# Patient Record
Sex: MALE | Race: WHITE | Employment: OTHER | URBAN - METROPOLITAN AREA
[De-identification: names, ages, dates, MRNs, and addresses within clinical notes are randomized per-mention and may not be internally consistent; named-entity substitution may affect disease eponyms.]

---

## 2017-02-21 DIAGNOSIS — H40.1131 PRIMARY OPEN ANGLE GLAUCOMA OF BOTH EYES, MILD STAGE: ICD-10-CM

## 2017-02-21 RX ORDER — LATANOPROST 50 UG/ML
1 SOLUTION/ DROPS OPHTHALMIC AT BEDTIME
Qty: 10 ML | Refills: 4 | Status: SHIPPED | OUTPATIENT
Start: 2017-02-21 | End: 2018-03-27

## 2017-03-07 ENCOUNTER — OFFICE VISIT (OUTPATIENT)
Dept: OPHTHALMOLOGY | Facility: CLINIC | Age: 72
End: 2017-03-07

## 2017-03-07 DIAGNOSIS — Z96.1 PSEUDOPHAKIA, BOTH EYES: ICD-10-CM

## 2017-03-07 DIAGNOSIS — H40.1131 PRIMARY OPEN ANGLE GLAUCOMA OF BOTH EYES, MILD STAGE: Primary | ICD-10-CM

## 2017-03-07 RX ORDER — PRAVASTATIN SODIUM 20 MG
20 TABLET ORAL AT BEDTIME
COMMUNITY
Start: 2017-02-14

## 2017-03-07 RX ORDER — PRAMIPEXOLE DIHYDROCHLORIDE 0.25 MG/1
TABLET ORAL
COMMUNITY
End: 2017-03-07

## 2017-03-07 RX ORDER — LEVOTHYROXINE SODIUM 50 UG/1
50 TABLET ORAL DAILY
COMMUNITY
Start: 2017-02-14

## 2017-03-07 RX ORDER — TRIAMTERENE AND HYDROCHLOROTHIAZIDE 37.5; 25 MG/1; MG/1
1 CAPSULE ORAL DAILY
COMMUNITY
Start: 2017-02-14 | End: 2019-02-26

## 2017-03-07 RX ORDER — ZOLPIDEM TARTRATE 10 MG/1
TABLET ORAL
COMMUNITY
Start: 2016-02-13 | End: 2017-09-12

## 2017-03-07 RX ORDER — ACETAMINOPHEN 500 MG
1000 TABLET ORAL
COMMUNITY
Start: 2017-03-02

## 2017-03-07 RX ORDER — LISINOPRIL 40 MG/1
40 TABLET ORAL
COMMUNITY
Start: 2017-03-03 | End: 2017-03-07

## 2017-03-07 RX ORDER — METOPROLOL SUCCINATE 25 MG/1
25 TABLET, EXTENDED RELEASE ORAL DAILY
COMMUNITY
Start: 2017-02-14

## 2017-03-07 RX ORDER — TRAMADOL HYDROCHLORIDE 50 MG/1
TABLET ORAL
COMMUNITY
Start: 2016-11-22 | End: 2019-02-26

## 2017-03-07 ASSESSMENT — REFRACTION_WEARINGRX
OS_AXIS: 180
OS_CYLINDER: +3.75
OS_SPHERE: -0.75
OD_SPHERE: -3.00
OD_AXIS: 165
OD_ADD: +2.75
OD_CYLINDER: +1.25
OS_ADD: +2.75

## 2017-03-07 ASSESSMENT — EXTERNAL EXAM - LEFT EYE: OS_EXAM: BROW PTOSIS

## 2017-03-07 ASSESSMENT — TONOMETRY
OD_IOP_MMHG: 17
OS_IOP_MMHG: 16
IOP_METHOD: APPLANATION

## 2017-03-07 ASSESSMENT — VISUAL ACUITY
METHOD: SNELLEN - LINEAR
OD_CC: J1+
OD_CC: 20/20
OS_CC: 20/20
OS_CC: J1+
CORRECTION_TYPE: GLASSES

## 2017-03-07 ASSESSMENT — EXTERNAL EXAM - RIGHT EYE: OD_EXAM: BROW PTOSIS

## 2017-03-07 ASSESSMENT — CUP TO DISC RATIO
OD_RATIO: 0.55
OS_RATIO: 0.45

## 2017-03-07 NOTE — MR AVS SNAPSHOT
After Visit Summary   3/7/2017    Yordan Nolan    MRN: [id number]           Patient Information     Date Of Birth          1945        Visit Information        Provider Department      3/7/2017 1:30 PM Milady Colon MD [facility]        Today's Diagnoses     Primary open angle glaucoma of both eyes, mild stage    -  1    Pseudophakia, both eyes           Follow-ups after your visit        Follow-up notes from your care team     Return in about 6 months (around 9/7/2017) for IOP check with applanation (no dilation).      Your next 10 appointments already scheduled     Sep 12, 2017  1:00 PM CDT   Complete Exam with Milady Colon MD   [facility] ([facility])    [facility]  [address] [address] [phone]              Who to contact     Please call your clinic at [phone] to:    Ask questions about your health    Make or cancel appointments    Discuss your medicines    Learn about your test results    Speak to your doctor   If you have compliments or concerns about an experience at your clinic, or if you wish to file a complaint, please contact [facility] Patient Relations at [phone] or email us at [email or web address]         Additional Information About Your Visit        MyChart Information     [employer] is an electronic gateway that provides easy, online access to your medical records. With [employer], you can request a clinic appointment, read your test results, renew a prescription or communicate with your care team.     To sign up for [employer] visit the website at [email or web address]   You will be asked to enter the access code listed below, as well as some personal information. Please follow the directions to create your username and password.     Your access code is: [email or web address]  Expires: 6/5/2017  2:35 PM      Your access code will  in 90 days. If you need help or a new code, please contact your [facility] or call [phone] for assistance.        Care EveryWhere ID     This is your Care EveryWhere ID. This could be used by other organizations to access your [place] medical records  [id number]         Blood Pressure from Last 3 Encounters:   No data found for BP    Weight from Last 3 Encounters:   No data found for Wt              We Performed the Following     OCT Optic Nerve RNFL Spectralis OU (both eyes)     OVF 24-2 Dynamic OU          Today's Medication Changes          These changes are accurate as of: 3/7/17  2:54 PM.  If you have any questions, ask your nurse or doctor.               These medicines have changed or have updated prescriptions.        Dose/Directions    levothyroxine 50 MCG tablet   Commonly known as:  SYNTHROID/LEVOTHROID   This may have changed:  Another medication with the same name was removed. Continue taking this medication, and follow the directions you see here.        Dose:  50 mcg   Take 50 mcg by mouth   Refills:  0       lisinopril 40 MG tablet   Commonly known as:  PRINIVIL/ZESTRIL   This may have changed:  Another medication with the same name was removed. Continue taking this medication, and follow the directions you see here.        Dose:  40 mg   Take 40 mg by mouth   Refills:  0       MIRAPEX 0.25 MG tablet   This may have changed:  Another medication with the same name was removed. Continue taking this medication, and follow the directions you see here.   Generic drug:  pramipexole        Refills:  0       triamterene-hydrochlorothiazide 37.5-25 MG per capsule   Commonly known as:  DYAZIDE   This may have changed:  Another medication with the same name was removed. Continue taking this medication, and follow the directions you see here.        Dose:  1 capsule   Take 1 capsule by mouth   Refills:  0         Stop taking these medicines if  you haven't already. Please contact your care team if you have questions.     amLODIPine 2.5 MG tablet   Commonly known as:  NORVASC                    Primary Care Provider Office Phone # Fax #    Jeo Johnson [phone]       [facility] [address] [facility] [address]        Thank you!     Thank you for choosing [facility]  for your care. Our goal is always to provide you with excellent care. Hearing back from our patients is one way we can continue to improve our services. Please take a few minutes to complete the written survey that you may receive in the mail after your visit with us. Thank you!             Your Updated Medication List - Protect others around you: Learn how to safely use, store and throw away your medicines at www.disposemymeds.org.          This list is accurate as of: 3/7/17  2:54 PM.  Always use your most recent med list.                   Brand Name Dispense Instructions for use    acetaminophen 500 MG tablet    TYLENOL     Take 1,000 mg by mouth       apixaban ANTICOAGULANT 5 MG tablet    ELIQUIS     Take 5 mg by mouth       aspirin EC 81 MG EC tablet      Take 81 mg by mouth       latanoprost 0.005 % ophthalmic solution    XALATAN    10 mL    Place 1 drop into both eyes At Bedtime       levothyroxine 50 MCG tablet    SYNTHROID/LEVOTHROID     Take 50 mcg by mouth       lisinopril 40 MG tablet    PRINIVIL/ZESTRIL     Take 40 mg by mouth       metoprolol 25 MG 24 hr tablet    TOPROL-XL     Take 25 mg by mouth       MIRAPEX 0.25 MG tablet   Generic drug:  pramipexole          pravastatin 20 MG tablet    PRAVACHOL     Take 20 mg by mouth       * traMADol 50 MG tablet    ULTRAM     TAKE 1 TABLET BY MOUTH EVERY 6 HOURS AS NEEDED FOR PAIN (HEADACHE)       * traMADol 50 MG tablet    ULTRAM     TAKE 1 TABLET BY MOUTH EVERY 6 HOURS AS NEEDED FOR PAIN (HEADACHE)       triamterene-hydrochlorothiazide 37.5-25 MG per capsule    DYAZIDE      Take 1 capsule by mouth       * zolpidem 10 MG tablet    AMBIEN         * zolpidem 10 MG tablet    AMBIEN         * Notice:  This list has 4 medication(s) that are the same as other medications prescribed for you. Read the directions carefully, and ask your doctor or other care provider to review them with you.

## 2017-03-07 NOTE — NURSING NOTE
Chief Complaints and History of Present Illnesses   Patient presents with     Glaucoma Follow Up     OVF/OCT/Ap/DIL     HPI    Symptoms:     No floaters   No flashes   Dryness (Comment: refresh Optive PRN)   No itching   No burning            Comments:  Latanoprost BE at night(10:00p.m.)  Trouble refocusing after reading with  Slab off, but it has helped a lot.  Roseanne Sneed COT 1:57 PM March 7, 2017

## 2017-03-07 NOTE — PROGRESS NOTES
HPI  Yordan Nolan is a 72 year old male here for IOP check. He feels his vision is good and stable in both eyes with current glasses. He is doing well using latanoprost in both eyes at night.    Assessment & Plan      (H40.11X1) Primary open angle glaucoma of both eyes, mild stage  (primary encounter diagnosis)  Comment: Previously followed by Dr. Douglas. S/p SLT OU 11/2015.    FH: Negative  Pachymetry:  Gonioscopy:  Tmax:   Today's IOP: 17/16  Target IOP: 19 or less (per patient)  Current medications: Latanoprost OU QHS  Meds to avoid:  Visual field:OVF 24-2 3/7/16  OD: Few scattered depressed points, MD -2.5, PSD 4.3   OS: Superotemporal defect (lid defect vs superior arcuate vs secondary to CVA), MD -3.3, PSD 5.5 (stable)  Nerve OCT: Spectralis optic nerve OCT (3/7/17)  OD: Avg RNFL thickness 79 (last 70), red superior, inferior yellow  OS: Poor quality scan, Avg RNFL thickness 62 (last 78), red inferior and superior    IOP good today with stable ONH appearance - tilted with history of high myopia    Plan: Continue current regimen. Return to clinic in 6 months for applanation (no dilation)    (H02.831,  H02.834) Dermatochalasis of both upper eyelids  Comment: S/p bilateral upper lid bleph 15-20 years ago by Dr. Douglas. He is becoming more bothered by recurrent dermatochalasis, and I think the has brow ptosis contributing.   Plan: Given brow involvement and that this would be re-op, have discussed referral to oculoplastics in the past. He would like to call to schedule this himself.     (I63.9) Cerebrovascular accident (CVA), unspecified (HCC)  Comment: Found to have had a small stroke in 2016 which the neurologist said was in an area that processes vision. Stable VF testing today.  Plan: He is now following with neurology and cardiology.    (Z96.1) Pseudophakia, both eyes  Comment: Good acuity  Plan: Observe      -----------------------------------------------------------------------------------    Patient disposition:   Return in about 6 months (around 9/7/2017) for IOP check with applanation (no dilation). or sooner as needed.    Teaching statement:  I have confirmed the content of the chief complaint, history of present illness, review of systems, and past medical/surgical history sections and edited the information as needed.    I have interviewed and examined the patient and confirm the pertinent findings.  I have discussed the case with the resident/fellow and agree with the findings and plan as documented.    Milady Colon MD  Comprehensive Ophthalmology & Ocular Pathology  Department of Ophthalmology and Visual Neurosciences  [email or web address]  Pager [phone]

## 2017-09-12 ENCOUNTER — OFFICE VISIT (OUTPATIENT)
Dept: OPHTHALMOLOGY | Facility: CLINIC | Age: 72
End: 2017-09-12

## 2017-09-12 DIAGNOSIS — H02.833 DERMATOCHALASIS OF EYELIDS OF BOTH EYES: ICD-10-CM

## 2017-09-12 DIAGNOSIS — H57.819 BROW PTOSIS: ICD-10-CM

## 2017-09-12 DIAGNOSIS — H02.836 DERMATOCHALASIS OF EYELIDS OF BOTH EYES: ICD-10-CM

## 2017-09-12 DIAGNOSIS — H40.1131 PRIMARY OPEN ANGLE GLAUCOMA OF BOTH EYES, MILD STAGE: Primary | ICD-10-CM

## 2017-09-12 RX ORDER — GABAPENTIN 300 MG/1
300 CAPSULE ORAL 3 TIMES DAILY
COMMUNITY
Start: 2017-07-25

## 2017-09-12 ASSESSMENT — TONOMETRY
OS_IOP_MMHG: 18
IOP_METHOD: APPLANATION
OD_IOP_MMHG: 19

## 2017-09-12 ASSESSMENT — REFRACTION_WEARINGRX
OD_AXIS: 165
OD_CYLINDER: +1.25
OS_SPHERE: -0.75
OS_ADD: +2.75
OD_SPHERE: -3.00
OD_ADD: +2.75
OS_AXIS: 180
OS_CYLINDER: +3.75

## 2017-09-12 ASSESSMENT — CUP TO DISC RATIO
OS_RATIO: 0.45
OD_RATIO: 0.55

## 2017-09-12 ASSESSMENT — VISUAL ACUITY
OS_CC: 20/15
METHOD: SNELLEN - LINEAR
CORRECTION_TYPE: GLASSES
OD_CC: 20/25-2/+2

## 2017-09-12 ASSESSMENT — CONF VISUAL FIELD
METHOD: COUNTING FINGERS
OD_NORMAL: 1
OS_NORMAL: 1

## 2017-09-12 NOTE — NURSING NOTE
Chief Complaints and History of Present Illnesses   Patient presents with     Glaucoma Follow Up     HPI    Last Eye Exam:  3/7/17   Affected eye(s):  Both   Symptoms:     No decreased vision   Floaters (Comment: not new)   No flashes      Duration:  6 months   Frequency:  Constant       Do you have eye pain now?:  No      Comments:  Pt here for glaucoma follow up and pressure check of both eyes  Pt hasn't noticed any changes to his eyes or vision since his last visit here    Loly Hester, EPHRAIM 12:58 PM 09/12/2017

## 2017-09-12 NOTE — PROGRESS NOTES
HPI  Yordan Nolan is a 72 year old male here for IOP check. He feels his vision is good and stable in both eyes with current glasses. He is doing well using latanoprost in both eyes at night. He notes more drooping of his upper eyelid skin. It is pushing his eyelashes down, and his eyelashes are in his vision which bothers him when he drives.     Assessment & Plan      (H40.11X1) Primary open angle glaucoma of both eyes, mild stage  (primary encounter diagnosis)  Comment: Previously followed by Dr. Douglas. S/p SLT OU 11/2015.    FH: Negative  Pachymetry:  Gonioscopy:  Tmax:   Today's IOP: 18/19  Target IOP: 19 or less (per patient)  Current medications: Latanoprost OU QHS  Meds to avoid:  Visual field:OVF 24-2 3/7/16  OD: Few scattered depressed points, MD -2.5, PSD 4.3   OS: Superotemporal defect (lid defect vs superior arcuate vs secondary to CVA), MD -3.3, PSD 5.5 (stable)  Nerve OCT: Spectralis optic nerve OCT (3/7/17)  OD: Avg RNFL thickness 79 (last 70), red superior, inferior yellow  OS: Poor quality scan, Avg RNFL thickness 62 (last 78), red inferior and superior    IOP good today with stable ONH appearance - tilted with history of high myopia    Plan: Continue current regimen. Return to clinic in 6 months for applanation, OVF 24-2, dilation, nerve OCT OU    (H02.831,  H02.834) Dermatochalasis of both upper eyelids  Comment: S/p bilateral upper lid bleph 15-20 years ago by Dr. Douglas. He is becoming more bothered by recurrent dermatochalasis and brow ptosis contributing.   Plan: Refer to oculoplastics    (I63.9) Cerebrovascular accident (CVA), unspecified (HCC)  Comment: Found to have had a small stroke in 2016 which the neurologist said was in an area that processes vision. Stable VF testing last visit.  Plan: He is now following with neurology and cardiology.    (Z96.1) Pseudophakia, both eyes  Comment: Good acuity  Plan: Observe      -----------------------------------------------------------------------------------    Patient disposition:   Return in about 6 months (around 3/12/2018) for IOP with applanation, OVF 24-2, dilation, nerve OCT OU. or sooner as needed.    Teaching statement:  Complete documentation of historical and exam elements from today's encounter can be found in the full encounter summary report (not reduplicated in this progress note). I personally obtained the chief complaint(s) and history of present illness.  I confirmed and edited as necessary the review of systems, past medical/surgical history, family history, social history, and examination findings as documented by others; and I examined the patient myself. I personally reviewed the relevant tests, images, and reports as documented above.     I formulated and edited as necessary the assessment and plan and discussed the findings and management plan with the patient and family.      Milady Colon MD  Comprehensive Ophthalmology & Ocular Pathology  Department of Ophthalmology and Visual Neurosciences  [email or web address]  Pager [phone]

## 2017-09-12 NOTE — MR AVS SNAPSHOT
After Visit Summary   9/12/2017    Yordan Nolan    MRN: [id number]           Patient Information     Date Of Birth          1945        Visit Information        Provider Department      9/12/2017 1:00 PM Milady Colon MD [facility]        Today's Diagnoses     Primary open angle glaucoma of both eyes, mild stage    -  1    Dermatochalasis of eyelids of both eyes        Brow ptosis           Follow-ups after your visit        Additional Services     Plastic Ophthmalogy Referral       Your provider has referred you to: Oculoplastics Clinic - Dr. Vasquez or Dr. Swann [phone]    Please call and schedule your own appointment.     Please be aware that coverage of these services is subject to the terms and limitations of your health insurance plan.  Call member services at your health plan with any benefit or coverage questions.      Please inform our clinic Referral Specialist of any tests that you may have already completed.    Please bring the following with you to your appointment:    (1) Any X-Rays, CTs or MRIs which have been performed.  Contact the facility where they were done to arrange for  prior to your scheduled appointment.  Any new CT, MRI or other procedures ordered by your specialist must be performed at a [place] facility or coordinated by your clinic's referral office.    (2) List of current medications   (3) This referral request   (4) Any documents/labs given to you for this referral                  Follow-up notes from your care team     Return in about 6 months (around 3/12/2018) for IOP with applanation, OVF 24-2, dilation, nerve OCT OU.      Your next 10 appointments already scheduled     Sep 19, 2017  2:00 PM CDT   (Arrive by 1:45 PM)   NEW PLASTICS with Bobby Swann MD   [facility] ([facility])    [address]  [address]  [facility] [address] [phone]             Mar 13, 2018  1:15 PM CDT   Return Visit with Milady Colon MD   [facility]    [facility]  [address] [facility] [address]  [facility] [address] [phone]              Who to contact     Please call your clinic at [phone] to:    Ask questions about your health    Make or cancel appointments    Discuss your medicines    Learn about your test results    Speak to your doctor   If you have compliments or concerns about an experience at your clinic, or if you wish to file a complaint, please contact [facility] Patient Relations at [phone] or email us at [email or web address]         Additional Information About Your Visit        [employer] Information     [employer] gives you secure access to your electronic health record. If you see a primary care provider, you can also send messages to your care team and make appointments. If you have questions, please call your primary care clinic.  If you do not have a primary care provider, please call [phone] and they will assist you.      [employer] is an electronic gateway that provides easy, online access to your medical records. With [employer], you can request a clinic appointment, read your test results, renew a prescription or communicate with your care team.     To access your existing account, please contact your [facility] or call [phone] for assistance.        Care EveryWhere ID     This is your Care EveryWhere ID. This could be used by other organizations to access your [place] medical records  [id number]         Blood Pressure from Last 3 Encounters:   No data found for BP    Weight from Last 3 Encounters:   No data found for Wt              We Performed the Following     Plastic Ophthmalogy Referral          Today's Medication Changes          These changes are accurate as of: 9/12/17  1:35 PM.  If you have  any questions, ask your nurse or doctor.               Stop taking these medicines if you haven't already. Please contact your care team if you have questions.     aspirin EC 81 MG EC tablet   Stopped by:  Milady Colon MD           zolpidem 10 MG tablet   Commonly known as:  AMBIEN   Stopped by:  Milady Colon MD                    Primary Care Provider Office Phone # Fax #    Joe ROWE MD [place] [phone]       [place] [address]        Equal Access to Services     [facility]: Hadii aad ku hadasho Soomaali, waaxda luqadaha, qaybta kaalmada adeegyada, waxay idiin hayaan adeeg khrobertsh melissa . So [facility] [phone].    ATENCIÓN: Si habla español, tiene a ritter disposición servicios gratuitos de asistencia lingüística. [facility] [phone].    We comply with applicable federal civil rights laws and [place] laws. We do not discriminate on the basis of race, color, national origin, age, disability sex, sexual orientation or gender identity.            Thank you!     Thank you for choosing [facility]  for your care. Our goal is always to provide you with excellent care. Hearing back from our patients is one way we can continue to improve our services. Please take a few minutes to complete the written survey that you may receive in the mail after your visit with us. Thank you!             Your Updated Medication List - Protect others around you: Learn how to safely use, store and throw away your medicines at www.disposemymeds.org.          This list is accurate as of: 9/12/17  1:35 PM.  Always use your most recent med list.                   Brand Name Dispense Instructions for use Diagnosis    acetaminophen 500 MG tablet    TYLENOL     Take 1,000 mg by mouth        apixaban ANTICOAGULANT 5 MG tablet    ELIQUIS     Take 5 mg by mouth        gabapentin 300 MG capsule    NEURONTIN     Take 300 mg by mouth        latanoprost 0.005  % ophthalmic solution    XALATAN    10 mL    Place 1 drop into both eyes At Bedtime    Primary open angle glaucoma of both eyes, mild stage       levothyroxine 50 MCG tablet    SYNTHROID/LEVOTHROID     Take 50 mcg by mouth        lisinopril 40 MG tablet    PRINIVIL/ZESTRIL     Take 40 mg by mouth        metoprolol 25 MG 24 hr tablet    TOPROL-XL     Take 25 mg by mouth        MIRAPEX 0.25 MG tablet   Generic drug:  pramipexole           pravastatin 20 MG tablet    PRAVACHOL     Take 20 mg by mouth        * traMADol 50 MG tablet    ULTRAM     TAKE 1 TABLET BY MOUTH EVERY 6 HOURS AS NEEDED FOR PAIN (HEADACHE)        * traMADol 50 MG tablet    ULTRAM     TAKE 1 TABLET BY MOUTH EVERY 6 HOURS AS NEEDED FOR PAIN (HEADACHE)        triamterene-hydrochlorothiazide 37.5-25 MG per capsule    DYAZIDE     Take 1 capsule by mouth        * Notice:  This list has 2 medication(s) that are the same as other medications prescribed for you. Read the directions carefully, and ask your doctor or other care provider to review them with you.

## 2017-09-19 ENCOUNTER — OFFICE VISIT (OUTPATIENT)
Dept: OPHTHALMOLOGY | Facility: CLINIC | Age: 72
End: 2017-09-19

## 2017-09-19 VITALS — BODY MASS INDEX: 32.23 KG/M2 | WEIGHT: 238 LBS | HEIGHT: 72 IN

## 2017-09-19 DIAGNOSIS — H57.819 BROW PTOSIS: ICD-10-CM

## 2017-09-19 DIAGNOSIS — H02.839 DERMATOCHALASIS: Primary | ICD-10-CM

## 2017-09-19 DIAGNOSIS — H02.403 ACQUIRED PTOSIS OF EYELID, BILATERAL: ICD-10-CM

## 2017-09-19 ASSESSMENT — VISUAL ACUITY
OS_CC: 20/20
OD_CC: 20/20
METHOD: SNELLEN - LINEAR

## 2017-09-19 ASSESSMENT — REFRACTION_WEARINGRX
OS_CYLINDER: +3.75
OD_SPHERE: -3.00
OD_CYLINDER: +1.25
OD_AXIS: 165
OS_ADD: +2.75
OS_SPHERE: -0.75
OS_AXIS: 180
OD_ADD: +2.75

## 2017-09-19 ASSESSMENT — EXTERNAL EXAM - RIGHT EYE: OD_EXAM: BROW PTOSIS, WITH FRONTALIS RELAXED, BROW IS BELOW SUPERIOR ORBITAL RIM AND LATERALLY INLINE WITH UPPER LASHES

## 2017-09-19 ASSESSMENT — CONF VISUAL FIELD
METHOD: COUNTING FINGERS
OS_NORMAL: 1
OD_NORMAL: 1

## 2017-09-19 ASSESSMENT — SLIT LAMP EXAM - LIDS: COMMENTS: HEAVY DERMATOCHALASIS RESTING ON LASHES, TRUE PTOSIS

## 2017-09-19 ASSESSMENT — TONOMETRY
IOP_METHOD: ICARE
OS_IOP_MMHG: 19
OD_IOP_MMHG: 19

## 2017-09-19 NOTE — PROGRESS NOTES
Oculoplastic Clinic New Patient    Patient: Yordan Nolan MRN# [id number]   YOB: 1945 Age: 72 year old   Date of Visit: Sep 19, 2017    CC: Droopy eyelids obstructing vision.  Chief Complaints and History of Present Illnesses   Patient presents with     Dermatochalasis Evaluation                 HPI:     Yordan Nolan is a 72 year old male who has noted gradual onset of droopy eyelids over the past years. The droopy eyelid is interfering with activities of daily living including driving, and reading. The patient denies double vision, variability of the eyelid position, or dry eye symptoms. Prior upper eyelid surgery years ago with Dr. Garcia at [facility].     EXAM:     MRD1: 1 right eye and 0 left  Dermatochalasis with excess skin touching eyelashes   Brow ptosis with brow resting below superior orbital rim and in line with the eyelashes worse on the left    VISUAL FIELD:  Right eye untaped:10 degrees Right eye taped:45 degrees  Left eye untaped:2 degrees Left eye taped:45 degrees    Assessment & Plan     Yordan Nolan is a 72 year old male with the following diagnoses:   1. Dermatochalasis    2. Acquired ptosis of eyelid, bilateral    3. Brow ptosis           Both upper eyelid blepharoplasty , Bilateral ptosis repair MMCR 8 right eye and 9.5 left eye, direct midforehead browplasty (more aggressive left eye) - has deep rhytids.     Due to significant brow ptosis, blepharoplasty alone would be unsuccesfull in addressing the lateral hooding which is obstructing vision. Blepharoplasty alone would result in sewing very thin eyelid skin to thick sub-brow skin, and even with that, fail to address lateral hooding which is obstructing vision.       ANTICOAGULATION:  Eliquis - for afib  Will cc PCP regarding holding anticoagulation          PHOTOS DEMONSTRATE:    Significant dermatochalasis with lids resting on eyelashes and obstructing visual axis  Myogenic blepharoptosis  Brow ptosis with thicker brow  skin and hairs below the lateral superior orbital rim      Complete documentation of historical and exam elements from today's encounter can be found in the full encounter summary report (not reduplicated in this progress note). I personally obtained the chief complaint(s) and history of present illness.  I confirmed and edited as necessary the review of systems, past medical/surgical history, family history, social history, and examination findings as documented by others; and I examined the patient myself. I personally reviewed the relevant tests, images, and reports as documented above. I formulated and edited as necessary the assessment and plan and discussed the findings and management plan with the patient and family. - Bobby Swann MD      Today with Yordan Nolan  And his wife, I reviewed the indications, risks, benefits, and alternatives of the proposed surgical procedure including, but not limited to, failure obtain the desired result  and need for additional surgery, bleeding, infection, loss of vision, loss of the eye, and the remote possibility of permanent damage to any organ system or death with the use of anesthesia.  I provided multiple opportunities for the questions, answered all questions to the best of my ability, and confirmed that my answers and my discussion were understood.

## 2017-09-19 NOTE — NURSING NOTE
Chief Complaints and History of Present Illnesses   Patient presents with     Dermatochalasis Evaluation     HPI    Affected eye(s):  Both   Symptoms:     No blurred vision      Frequency:  Constant       Do you have eye pain now?:  No      Comments:  Pt states had lid surgery almost 20 years ago, pt has noticed that lids are worse. Vision improves when raising eye brows. No pain. Latanoprost QHS OU. Refresh PRN OU.    Giancarlo Chavira COT 1:41 PM September 19, 2017

## 2017-09-19 NOTE — MR AVS SNAPSHOT
After Visit Summary   9/19/2017    Yordan Nolan    MRN: [id number]           Patient Information     Date Of Birth          1945        Visit Information        Provider Department      9/19/2017 2:00 PM Bobby Swann MD [facility] Ophthalmology        Today's Diagnoses     Dermatochalasis    -  1    Acquired ptosis of eyelid, bilateral        Brow ptosis           Follow-ups after your visit        Your next 10 appointments already scheduled     Nov 16, 2017 10:15 AM CST   (Arrive by 10:00 AM)   Post-Op with Bobby Swann MD   [facility]hthalmology ([facility])    [address]  [address] [facility] [id number] [phone]            Mar 13, 2018  1:15 PM CDT   Return Visit with Milady Colon MD   [facility])    [facility]  [address] [address] [phone]              Who to contact     Please call your clinic at [phone] to:    Ask questions about your health    Make or cancel appointments    Discuss your medicines    Learn about your test results    Speak to your doctor   If you have compliments or concerns about an experience at your clinic, or if you wish to file a complaint, please contact [facility] Patient Relations at [phone] or email us at [email or web address]         Additional Information About Your Visit        MyChart Information     [employer] gives you secure access to your electronic health record. If you see a primary care provider, you can also send messages to your care team and make appointments. If you have questions, please call your primary care clinic.  If you do not have a primary care provider, please call [phone] and they will assist you.      [employer] is an electronic gateway that provides easy, online access to your medical records. With [employer], you can request  a clinic appointment, read your test results, renew a prescription or communicate with your care team.     To access your existing account, please contact your [facility] or call [phone] for assistance.        Care EveryWhere ID     This is your Care EveryWhere ID. This could be used by other organizations to access your [facility] medical records  [id number]        Your Vitals Were     Height BMI (Body Mass Index)                1.829 m (6') 32.28 kg/m2           Blood Pressure from Last 3 Encounters:   No data found for BP    Weight from Last 3 Encounters:   09/19/17 108 kg (238 lb)              We Performed the Following     External Photos OU (both eyes)     Henson VF Ptosis OU     Sierra-Operative Worksheet (Plastics)        Primary Care Provider Office Phone # Fax #    Joe ROWE MD Elizabeth [phone]       [facility] [address] [place] [id number]        Equal Access to Services     EVELYN PAULINO : Hadii aad ku hadasho Soomaali, waaxda luqadaha, qaybta kaalmada adeegyada, waxay idiin hayaan clinton kharakendal condeaan . So [facility] [phone].    ATENCIÓN: Si habla español, tiene a ritter disposición servicios gratuitos de asistencia lingüística. Luther al [phone].    We comply with applicable federal civil rights laws and [place] laws. We do not discriminate on the basis of race, color, national origin, age, disability sex, sexual orientation or gender identity.            Thank you!     Thank you for choosing [facility] OPHTHALMOLOGY  for your care. Our goal is always to provide you with excellent care. Hearing back from our patients is one way we can continue to improve our services. Please take a few minutes to complete the written survey that you may receive in the mail after your visit with us. Thank you!             Your Updated Medication List - Protect others around you: Learn how to safely use, store and throw away your medicines at  www.disposemymeds.org.          This list is accurate as of: 9/19/17  2:31 PM.  Always use your most recent med list.                   Brand Name Dispense Instructions for use Diagnosis    acetaminophen 500 MG tablet    TYLENOL     Take 1,000 mg by mouth        apixaban ANTICOAGULANT 5 MG tablet    ELIQUIS     Take 5 mg by mouth        gabapentin 300 MG capsule    NEURONTIN     Take 300 mg by mouth        latanoprost 0.005 % ophthalmic solution    XALATAN    10 mL    Place 1 drop into both eyes At Bedtime    Primary open angle glaucoma of both eyes, mild stage       levothyroxine 50 MCG tablet    SYNTHROID/LEVOTHROID     Take 50 mcg by mouth        lisinopril 40 MG tablet    PRINIVIL/ZESTRIL     Take 40 mg by mouth        metoprolol 25 MG 24 hr tablet    TOPROL-XL     Take 25 mg by mouth        MIRAPEX 0.25 MG tablet   Generic drug:  pramipexole           pravastatin 20 MG tablet    PRAVACHOL     Take 20 mg by mouth        * traMADol 50 MG tablet    ULTRAM     TAKE 1 TABLET BY MOUTH EVERY 6 HOURS AS NEEDED FOR PAIN (HEADACHE)        * traMADol 50 MG tablet    ULTRAM     TAKE 1 TABLET BY MOUTH EVERY 6 HOURS AS NEEDED FOR PAIN (HEADACHE)        triamterene-hydrochlorothiazide 37.5-25 MG per capsule    DYAZIDE     Take 1 capsule by mouth        * Notice:  This list has 2 medication(s) that are the same as other medications prescribed for you. Read the directions carefully, and ask your doctor or other care provider to review them with you.

## 2017-11-13 ENCOUNTER — MYC MEDICAL ADVICE (OUTPATIENT)
Dept: OPHTHALMOLOGY | Facility: CLINIC | Age: 72
End: 2017-11-13

## 2017-12-01 NOTE — TELEPHONE ENCOUNTER
Spoke with patient, 12/1 surgery was cancelled yesterday and I called patient to ask if 02/08 in the [facility] was okay vs 02/07.  Patient stated he would like to hold off for now to see how he will recover from his back surgery.  I told patient to give me a call back once he is ready to proceed.   Patient had no further questions.

## 2018-01-11 ENCOUNTER — TELEPHONE (OUTPATIENT)
Dept: OPHTHALMOLOGY | Facility: CLINIC | Age: 73
End: 2018-01-11

## 2018-01-11 NOTE — TELEPHONE ENCOUNTER
Called patient to schedule surgery with Dr. Bobby Swann.  Surgery was scheduled on 03/16/18 with follow up on 03/27/18.  Patient will have H&P at PCP on 02/16. Patient had no further questions, packet was mailed.

## 2018-03-09 ENCOUNTER — MYC MEDICAL ADVICE (OUTPATIENT)
Dept: OPHTHALMOLOGY | Facility: CLINIC | Age: 73
End: 2018-03-09

## 2018-03-14 ENCOUNTER — TELEPHONE (OUTPATIENT)
Dept: OPHTHALMOLOGY | Facility: CLINIC | Age: 73
End: 2018-03-14

## 2018-03-14 NOTE — TELEPHONE ENCOUNTER
Patient called to reschedule surgery with Dr. Bobby Swann on 03/16/2018.   Surgery was scheduled on 05/04 due to Medical Reasons  Patient will have H&P at PCP  Post-Op care appointment will be on 05/22  Patient is aware a / is needed day of surgery.   Patient did not need a new packet, patient has my direct contact information for any further questions.

## 2018-03-27 ENCOUNTER — OFFICE VISIT (OUTPATIENT)
Dept: OPHTHALMOLOGY | Facility: CLINIC | Age: 73
End: 2018-03-27
Payer: MEDICARE

## 2018-03-27 DIAGNOSIS — H40.1131 PRIMARY OPEN ANGLE GLAUCOMA OF BOTH EYES, MILD STAGE: ICD-10-CM

## 2018-03-27 RX ORDER — LATANOPROST 50 UG/ML
1 SOLUTION/ DROPS OPHTHALMIC AT BEDTIME
Qty: 10 ML | Refills: 4 | Status: SHIPPED | OUTPATIENT
Start: 2018-03-27 | End: 2019-02-26

## 2018-03-27 ASSESSMENT — EXTERNAL EXAM - RIGHT EYE: OD_EXAM: BROW PTOSIS, WITH FRONTALIS RELAXED, BROW IS BELOW SUPERIOR ORBITAL RIM AND LATERALLY INLINE WITH UPPER LASHES

## 2018-03-27 ASSESSMENT — REFRACTION_WEARINGRX
SPECS_TYPE: PAL
OD_ADD: +2.50
OS_SPHERE: -0.75
OS_AXIS: 180
OS_CYLINDER: +3.75
OD_CYLINDER: +1.25
OD_SPHERE: -3.00
OD_AXIS: 165
OS_ADD: +2.50

## 2018-03-27 ASSESSMENT — SLIT LAMP EXAM - LIDS: COMMENTS: HEAVY DERMATOCHALASIS RESTING ON LASHES, TRUE PTOSIS

## 2018-03-27 ASSESSMENT — CUP TO DISC RATIO
OS_RATIO: 0.45
OD_RATIO: 0.55

## 2018-03-27 ASSESSMENT — CONF VISUAL FIELD
OD_NORMAL: 1
METHOD: COUNTING FINGERS
OS_NORMAL: 1

## 2018-03-27 ASSESSMENT — VISUAL ACUITY
OD_CC: 20/20
OS_CC+: +3
CORRECTION_TYPE: GLASSES
METHOD: SNELLEN - LINEAR
OD_CC+: -
OS_CC: 20/20

## 2018-03-27 ASSESSMENT — TONOMETRY
OS_IOP_MMHG: 16
OD_IOP_MMHG: 16
IOP_METHOD: APPLANATION

## 2018-03-27 NOTE — PROGRESS NOTES
HPI  Yordan Nolan is a 72 year old male here for IOP check. He feels his vision is good and stable in both eyes with current glasses. He is doing well using latanoprost in both eyes at night. He notes more drooping of his upper eyelid skin. It is pushing his eyelashes down, and his eyelashes are in his vision which bothers him when he drives.     Assessment & Plan      (H40.11X1) Primary open angle glaucoma of both eyes, mild stage  (primary encounter diagnosis)  Comment: Previously followed by Dr. Douglas. S/p SLT OU 11/2015.    FH: Negative  Pachymetry:  Gonioscopy:  Tmax:   Today's IOP: 16/16  Target IOP: 19 or less (per patient)  Current medications: Latanoprost OU QHS  Meds to avoid:  Visual field:OVF 24-2 3/27/18  OD: Generalized depression (had trouble with the machine today)  OS: Superotemporal defect (lid defect vs superior arcuate vs secondary to CVA), MD -5.1, PSD 4.5 (stable)  Nerve OCT: Spectralis optic nerve OCT (3/27/18)  OD: Avg RNFL thickness 73 (last 70), red superior, inferior yellow  OS: Poor quality scan, Avg RNFL thickness 77 (last 79), red inferior and superior    IOP good today with stable ONH appearance - tilted with history of high myopia    Plan: Continue current regimen. Return to clinic in 6 months for applanation, no dilation    (H02.831,  H02.834) Dermatochalasis of both upper eyelids  Comment: S/p bilateral upper lid bleph 15-20 years ago by Dr. Douglas. He is becoming more bothered by recurrent dermatochalasis and brow ptosis contributing.   Plan: He is scheduled for repair with Dr. Swann next month.    (I63.9) Cerebrovascular accident (CVA), unspecified (HCC)  Comment: Found to have had a small stroke in 2016 which the neurologist said was in an area that processes vision. Stable VF testing last visit.  Plan: He is now following with neurology and cardiology.    (Z96.1) Pseudophakia, both eyes  Comment: Good acuity, mild PCO OD  Plan: Observe      -----------------------------------------------------------------------------------    Patient disposition:   Return in about 6 months (around 9/27/2018) for applanation IOP (no dilation). or sooner as needed.    Teaching statement:  Complete documentation of historical and exam elements from today's encounter can be found in the full encounter summary report (not reduplicated in this progress note). I personally obtained the chief complaint(s) and history of present illness.  I confirmed and edited as necessary the review of systems, past medical/surgical history, family history, social history, and examination findings as documented by others; and I examined the patient myself. I personally reviewed the relevant tests, images, and reports as documented above.     I formulated and edited as necessary the assessment and plan and discussed the findings and management plan with the patient and family.      Milady Colon MD  Comprehensive Ophthalmology & Ocular Pathology  Department of Ophthalmology and Visual Neurosciences  [email or web address]  Pager [phone]

## 2018-03-27 NOTE — MR AVS SNAPSHOT
After Visit Summary   3/27/2018    Yordan Nolan    MRN: [id number]           Patient Information     Date Of Birth          1945        Visit Information        Provider Department      3/27/2018 1:45 PM Milady Colon MD [facility]        Today's Diagnoses     Primary open angle glaucoma of both eyes, mild stage           Follow-ups after your visit        Your next 10 appointments already scheduled     May 04, 2018   Procedure with Bobby Swann MD   [facility] PeriOP Services (--)    [address] [place] [address] Ave, [address]  [facility] [address]   [phone]            May 22, 2018 11:00 AM CDT   (Arrive by 10:45 AM)   Post-Op with Bobby Swann MD   [facility] ([facility])    [address] [place] [id number] [phone]            Sep 11, 2018  1:15 PM CDT   Return Visit with Milady Colon MD   [facility])    [facility]  [address] [address] [phone]              Who to contact     Please call your clinic at [phone] to:    Ask questions about your health    Make or cancel appointments    Discuss your medicines    Learn about your test results    Speak to your doctor            Additional Information About Your Visit        MyChart Information     [employer] gives you secure access to your electronic health record. If you see a primary care provider, you can also send messages to your care team and make appointments. If you have questions, please call your primary care clinic.  If you do not have a primary care provider, please call [phone] and they will assist you.      [employer] is an electronic gateway that provides easy, online access to your medical records. With [employer], you can request a clinic appointment, read your test results, renew a prescription or communicate  with your care team.     To access your existing account, please contact your [facility] or call [phone] for assistance.        Care EveryWhere ID     This is your Care EveryWhere ID. This could be used by other organizations to access your [place] medical records  [id number]         Blood Pressure from Last 3 Encounters:   No data found for BP    Weight from Last 3 Encounters:   09/19/17 108 kg (238 lb)              Today, you had the following     No orders found for display         Where to get your medicines      [email or web address] [place], [place] - [address] [facility] [address]  [address] [facility] [address], [place] [facility] [place] [address]     Phone:  [phone]     latanoprost 0.005 % ophthalmic solution          Primary Care Provider Office Phone # Fax #    Joe Johnson [phone]       [facility] [address] [facility] [address]        Equal Access to Services     [facility]: Hadii aad ku hadasho Soomaali, waaxda luqadaha, qaybta kaalmada adeegyada, waxay idiin hayaan clinton torres . So [facility] [phone].    ATENCIÓN: Si habla español, tiene a ritter disposición servicios gratuitos de asistencia lingüística. Llame al [phone].    We comply with applicable federal civil rights laws and [place] laws. We do not discriminate on the basis of race, color, national origin, age, disability, sex, sexual orientation, or gender identity.            Thank you!     Thank you for choosing [facility]  for your care. Our goal is always to provide you with excellent care. Hearing back from our patients is one way we can continue to improve our services. Please take a few minutes to complete the written survey that you may receive in the mail after your visit with us. Thank you!             Your Updated Medication List - Protect others around you: Learn how to safely use, store and  throw away your medicines at www.disposemymeds.org.          This list is accurate as of 3/27/18  3:25 PM.  Always use your most recent med list.                   Brand Name Dispense Instructions for use Diagnosis    acetaminophen 500 MG tablet    TYLENOL     Take 1,000 mg by mouth        apixaban ANTICOAGULANT 5 MG tablet    ELIQUIS     Take 5 mg by mouth        gabapentin 300 MG capsule    NEURONTIN     Take 300 mg by mouth        latanoprost 0.005 % ophthalmic solution    XALATAN    10 mL    Place 1 drop into both eyes At Bedtime    Primary open angle glaucoma of both eyes, mild stage       levothyroxine 50 MCG tablet    SYNTHROID/LEVOTHROID     Take 50 mcg by mouth        lisinopril 40 MG tablet    PRINIVIL/ZESTRIL     Take 40 mg by mouth        metoprolol succinate 25 MG 24 hr tablet    TOPROL-XL     Take 25 mg by mouth        MIRAPEX 0.25 MG tablet   Generic drug:  pramipexole           pravastatin 20 MG tablet    PRAVACHOL     Take 20 mg by mouth        * traMADol 50 MG tablet    ULTRAM     TAKE 1 TABLET BY MOUTH EVERY 6 HOURS AS NEEDED FOR PAIN (HEADACHE)        * traMADol 50 MG tablet    ULTRAM     TAKE 1 TABLET BY MOUTH EVERY 6 HOURS AS NEEDED FOR PAIN (HEADACHE)        triamterene-hydrochlorothiazide 37.5-25 MG per capsule    DYAZIDE     Take 1 capsule by mouth        * Notice:  This list has 2 medication(s) that are the same as other medications prescribed for you. Read the directions carefully, and ask your doctor or other care provider to review them with you.

## 2018-03-27 NOTE — NURSING NOTE
Chief Complaints and History of Present Illnesses   Patient presents with     Primary Open Angle Glaucoma Follow Up     HPI    Affected eye(s):  Both   Symptoms:     No decreased vision   No floaters   No flashes   Dryness (Comment: mild)      Duration:  6 months   Frequency:  Constant       Do you have eye pain now?:  No      Comments:  Latanoprost QHS to BE at QHS / LD at 10 pm last aminah  No new concerns.  Ava Shine COT 2:46 PM 03/27/2018

## 2018-05-03 RX ORDER — FERROUS SULFATE 325(65) MG
325 TABLET ORAL
COMMUNITY
End: 2020-01-07

## 2018-05-04 ENCOUNTER — ANESTHESIA (OUTPATIENT)
Dept: SURGERY | Facility: CLINIC | Age: 73
End: 2018-05-04
Payer: MEDICARE

## 2018-05-04 ENCOUNTER — ANESTHESIA EVENT (OUTPATIENT)
Dept: SURGERY | Facility: CLINIC | Age: 73
End: 2018-05-04
Payer: MEDICARE

## 2018-05-04 ENCOUNTER — SURGERY (OUTPATIENT)
Age: 73
End: 2018-05-04

## 2018-05-04 ENCOUNTER — HOSPITAL ENCOUNTER (OUTPATIENT)
Facility: CLINIC | Age: 73
Discharge: HOME | End: 2018-05-04
Attending: OPHTHALMOLOGY | Admitting: OPHTHALMOLOGY
Payer: MEDICARE

## 2018-05-04 VITALS
DIASTOLIC BLOOD PRESSURE: 88 MMHG | HEIGHT: 72 IN | WEIGHT: 241.25 LBS | OXYGEN SATURATION: 96 % | RESPIRATION RATE: 16 BRPM | SYSTOLIC BLOOD PRESSURE: 167 MMHG | TEMPERATURE: 97.2 F | BODY MASS INDEX: 32.68 KG/M2

## 2018-05-04 DIAGNOSIS — Z98.890 POSTOPERATIVE STATE: Primary | ICD-10-CM

## 2018-05-04 PROCEDURE — 36000058 ZZH SURGERY LEVEL 3 EA 15 ADDTL MIN: Performed by: OPHTHALMOLOGY

## 2018-05-04 PROCEDURE — A9270 NON-COVERED ITEM OR SERVICE: HCPCS | Mod: GY | Performed by: OPHTHALMOLOGY

## 2018-05-04 PROCEDURE — 36000056 ZZH SURGERY LEVEL 3 1ST 30 MIN: Performed by: OPHTHALMOLOGY

## 2018-05-04 PROCEDURE — 25000128 H RX IP 250 OP 636: Performed by: NURSE ANESTHETIST, CERTIFIED REGISTERED

## 2018-05-04 PROCEDURE — 71000012 ZZH RECOVERY PHASE 1 LEVEL 1 FIRST HR: Performed by: OPHTHALMOLOGY

## 2018-05-04 PROCEDURE — 40000170 ZZH STATISTIC PRE-PROCEDURE ASSESSMENT II: Performed by: OPHTHALMOLOGY

## 2018-05-04 PROCEDURE — 25000125 ZZHC RX 250: Performed by: OPHTHALMOLOGY

## 2018-05-04 PROCEDURE — 27210794 ZZH OR GENERAL SUPPLY STERILE: Performed by: OPHTHALMOLOGY

## 2018-05-04 PROCEDURE — 25000125 ZZHC RX 250: Performed by: NURSE ANESTHETIST, CERTIFIED REGISTERED

## 2018-05-04 PROCEDURE — 25000132 ZZH RX MED GY IP 250 OP 250 PS 637: Mod: GY | Performed by: OPHTHALMOLOGY

## 2018-05-04 PROCEDURE — 71000027 ZZH RECOVERY PHASE 2 EACH 15 MINS: Performed by: OPHTHALMOLOGY

## 2018-05-04 PROCEDURE — 37000008 ZZH ANESTHESIA TECHNICAL FEE, 1ST 30 MIN: Performed by: OPHTHALMOLOGY

## 2018-05-04 PROCEDURE — 37000009 ZZH ANESTHESIA TECHNICAL FEE, EACH ADDTL 15 MIN: Performed by: OPHTHALMOLOGY

## 2018-05-04 RX ORDER — ERYTHROMYCIN 5 MG/G
OINTMENT OPHTHALMIC PRN
Status: DISCONTINUED | OUTPATIENT
Start: 2018-05-04 | End: 2018-05-04 | Stop reason: HOSPADM

## 2018-05-04 RX ORDER — TETRACAINE HYDROCHLORIDE 5 MG/ML
SOLUTION OPHTHALMIC PRN
Status: DISCONTINUED | OUTPATIENT
Start: 2018-05-04 | End: 2018-05-04 | Stop reason: HOSPADM

## 2018-05-04 RX ORDER — PROPOFOL 10 MG/ML
INJECTION, EMULSION INTRAVENOUS PRN
Status: DISCONTINUED | OUTPATIENT
Start: 2018-05-04 | End: 2018-05-04

## 2018-05-04 RX ORDER — NEOMYCIN SULFATE, POLYMYXIN B SULFATE AND DEXAMETHASONE 3.5; 10000; 1 MG/ML; [USP'U]/ML; MG/ML
1 SUSPENSION/ DROPS OPHTHALMIC 4 TIMES DAILY
Qty: 2 ML | Refills: 0 | Status: SHIPPED | OUTPATIENT
Start: 2018-05-04 | End: 2018-05-14

## 2018-05-04 RX ORDER — NALOXONE HYDROCHLORIDE 0.4 MG/ML
.1-.4 INJECTION, SOLUTION INTRAMUSCULAR; INTRAVENOUS; SUBCUTANEOUS
Status: DISCONTINUED | OUTPATIENT
Start: 2018-05-04 | End: 2018-05-04 | Stop reason: HOSPADM

## 2018-05-04 RX ORDER — HYDROCODONE BITARTRATE AND ACETAMINOPHEN 5; 325 MG/1; MG/1
1 TABLET ORAL ONCE
Status: COMPLETED | OUTPATIENT
Start: 2018-05-04 | End: 2018-05-04

## 2018-05-04 RX ORDER — PHYSOSTIGMINE SALICYLATE 1 MG/ML
1.2 INJECTION INTRAVENOUS
Status: DISCONTINUED | OUTPATIENT
Start: 2018-05-04 | End: 2018-05-04 | Stop reason: HOSPADM

## 2018-05-04 RX ORDER — FENTANYL CITRATE 50 UG/ML
25-50 INJECTION, SOLUTION INTRAMUSCULAR; INTRAVENOUS
Status: DISCONTINUED | OUTPATIENT
Start: 2018-05-04 | End: 2018-05-04 | Stop reason: HOSPADM

## 2018-05-04 RX ORDER — ONDANSETRON 2 MG/ML
4 INJECTION INTRAMUSCULAR; INTRAVENOUS EVERY 30 MIN PRN
Status: DISCONTINUED | OUTPATIENT
Start: 2018-05-04 | End: 2018-05-04 | Stop reason: HOSPADM

## 2018-05-04 RX ORDER — HYDROCODONE BITARTRATE AND ACETAMINOPHEN 5; 325 MG/1; MG/1
1 TABLET ORAL EVERY 6 HOURS PRN
Qty: 10 TABLET | Refills: 0 | Status: SHIPPED | OUTPATIENT
Start: 2018-05-04 | End: 2019-02-26

## 2018-05-04 RX ORDER — ONDANSETRON 4 MG/1
4 TABLET, ORALLY DISINTEGRATING ORAL EVERY 30 MIN PRN
Status: DISCONTINUED | OUTPATIENT
Start: 2018-05-04 | End: 2018-05-04 | Stop reason: HOSPADM

## 2018-05-04 RX ORDER — SODIUM CHLORIDE, SODIUM LACTATE, POTASSIUM CHLORIDE, CALCIUM CHLORIDE 600; 310; 30; 20 MG/100ML; MG/100ML; MG/100ML; MG/100ML
INJECTION, SOLUTION INTRAVENOUS CONTINUOUS PRN
Status: DISCONTINUED | OUTPATIENT
Start: 2018-05-04 | End: 2018-05-04

## 2018-05-04 RX ORDER — ERYTHROMYCIN 5 MG/G
OINTMENT OPHTHALMIC
Qty: 3.5 G | Refills: 0 | Status: SHIPPED | OUTPATIENT
Start: 2018-05-04 | End: 2019-02-26

## 2018-05-04 RX ORDER — FENTANYL CITRATE 50 UG/ML
25-50 INJECTION, SOLUTION INTRAMUSCULAR; INTRAVENOUS EVERY 5 MIN PRN
Status: DISCONTINUED | OUTPATIENT
Start: 2018-05-04 | End: 2018-05-04 | Stop reason: HOSPADM

## 2018-05-04 RX ORDER — SODIUM CHLORIDE, SODIUM LACTATE, POTASSIUM CHLORIDE, CALCIUM CHLORIDE 600; 310; 30; 20 MG/100ML; MG/100ML; MG/100ML; MG/100ML
INJECTION, SOLUTION INTRAVENOUS CONTINUOUS
Status: DISCONTINUED | OUTPATIENT
Start: 2018-05-04 | End: 2018-05-04 | Stop reason: HOSPADM

## 2018-05-04 RX ORDER — FENTANYL CITRATE 50 UG/ML
INJECTION, SOLUTION INTRAMUSCULAR; INTRAVENOUS PRN
Status: DISCONTINUED | OUTPATIENT
Start: 2018-05-04 | End: 2018-05-04

## 2018-05-04 RX ORDER — MEPERIDINE HYDROCHLORIDE 25 MG/ML
12.5 INJECTION INTRAMUSCULAR; INTRAVENOUS; SUBCUTANEOUS
Status: DISCONTINUED | OUTPATIENT
Start: 2018-05-04 | End: 2018-05-04 | Stop reason: HOSPADM

## 2018-05-04 RX ORDER — LIDOCAINE HYDROCHLORIDE 20 MG/ML
INJECTION, SOLUTION INFILTRATION; PERINEURAL PRN
Status: DISCONTINUED | OUTPATIENT
Start: 2018-05-04 | End: 2018-05-04

## 2018-05-04 RX ADMIN — ERYTHROMYCIN 2 G: 5 OINTMENT OPHTHALMIC at 09:14

## 2018-05-04 RX ADMIN — FENTANYL CITRATE 50 MCG: 50 INJECTION, SOLUTION INTRAMUSCULAR; INTRAVENOUS at 09:21

## 2018-05-04 RX ADMIN — PROPOFOL 60 MG: 10 INJECTION, EMULSION INTRAVENOUS at 09:28

## 2018-05-04 RX ADMIN — LIDOCAINE HYDROCHLORIDE 60 MG: 20 INJECTION, SOLUTION INFILTRATION; PERINEURAL at 09:28

## 2018-05-04 RX ADMIN — FENTANYL CITRATE 25 MCG: 50 INJECTION, SOLUTION INTRAMUSCULAR; INTRAVENOUS at 09:30

## 2018-05-04 RX ADMIN — PROPOFOL 20 MG: 10 INJECTION, EMULSION INTRAVENOUS at 09:44

## 2018-05-04 RX ADMIN — HYDROCODONE BITARTRATE AND ACETAMINOPHEN 1 TABLET: 5; 325 TABLET ORAL at 10:28

## 2018-05-04 RX ADMIN — SODIUM CHLORIDE, POTASSIUM CHLORIDE, SODIUM LACTATE AND CALCIUM CHLORIDE: 600; 310; 30; 20 INJECTION, SOLUTION INTRAVENOUS at 09:17

## 2018-05-04 RX ADMIN — LIDOCAINE HYDROCHLORIDE,EPINEPHRINE BITARTRATE 8 ML: 20; .01 INJECTION, SOLUTION INFILTRATION; PERINEURAL at 09:29

## 2018-05-04 RX ADMIN — TETRACAINE HYDROCHLORIDE 2 DROP: 5 SOLUTION OPHTHALMIC at 09:22

## 2018-05-04 RX ADMIN — FENTANYL CITRATE 25 MCG: 50 INJECTION, SOLUTION INTRAMUSCULAR; INTRAVENOUS at 09:26

## 2018-05-04 RX ADMIN — DEXMEDETOMIDINE HYDROCHLORIDE 8 MCG: 100 INJECTION, SOLUTION INTRAVENOUS at 09:21

## 2018-05-04 ASSESSMENT — ENCOUNTER SYMPTOMS: DYSRHYTHMIAS: 1

## 2018-05-04 NOTE — ANESTHESIA PREPROCEDURE EVALUATION
"  Anesthesia Evaluation     . Pt has had prior anesthetic.            ROS/MED HX    ENT/Pulmonary:     (+)sleep apnea, uses CPAP , . .   (-) Other pulmonary disease   Neurologic:     (+)CVA (cryptogenic stroke; probably embolic related to A. Fib.) date: ~2015 with deficits- Aware of episodic bilateral visual changes (\"floaters in shapes\"), other neuro glaucoma   (-) Parkinson's disease   Cardiovascular:     (+) hypertension----. Taking blood thinners (Eliquis) : . . . :. dysrhythmias a-fib, .       METS/Exercise Tolerance:     Hematologic:         Musculoskeletal:   (+) , , other musculoskeletal- lumbar spinal stenosis      GI/Hepatic:     (+) hiatal hernia (\"small hiatal hernia\"),       Renal/Genitourinary:         Endo:     (+) thyroid problem hypothyroidism, Obesity, .      Psychiatric:         Infectious Disease:         Malignancy:         Other:                     Physical Exam  Normal systems: pulmonary    Airway   Mallampati: II  TM distance: >3 FB  Neck ROM: full    Dental     Cardiovascular   Rhythm and rate: regular and normal      Pulmonary                     Anesthesia Plan      History & Physical Review  History and physical reviewed and following examination; no interval change.    ASA Status:  3 .    NPO Status:  > 8 hours    Plan for MAC          Postoperative Care  Postoperative pain management:  Oral pain medications.      Consents  Anesthetic plan, risks, benefits and alternatives discussed with:  Patient..      DPreop diagnosis: AQUIRED PTOSIS OF EYELID BILATERAL, BROW PTOSIS  Procedure(s):  COMBINED REPAIR PTOSIS WITH BLEPHAROPLASTY BILATERAL  REPAIR PTOSIS BROW BILATERAL  No Known Allergies    No current facility-administered medications on file prior to encounter.   Current Outpatient Prescriptions on File Prior to Encounter:  acetaminophen (TYLENOL) 500 MG tablet Take 1,000 mg by mouth   apixaban ANTICOAGULANT (ELIQUIS) 5 MG tablet Take 5 mg by mouth 2 times daily    gabapentin (NEURONTIN) " 300 MG capsule Take 300 mg by mouth 3 times daily    levothyroxine (SYNTHROID/LEVOTHROID) 50 MCG tablet Take 50 mcg by mouth daily    lisinopril (PRINIVIL,ZESTRIL) 40 MG tablet Take 40 mg by mouth   metoprolol (TOPROL-XL) 25 MG 24 hr tablet Take 25 mg by mouth daily    pramipexole (MIRAPEX) 0.25 MG tablet Take 0.125 mg by mouth At Bedtime    pravastatin (PRAVACHOL) 20 MG tablet Take 20 mg by mouth At Bedtime    traMADol (ULTRAM) 50 MG tablet TAKE 1 TABLET BY MOUTH EVERY 6 HOURS AS NEEDED FOR PAIN (HEADACHE)   triamterene-hydrochlorothiazide (DYAZIDE) 37.5-25 MG per capsule Take 1 capsule by mouth daily      No results found for: HGB, INR, POTASSIUM                  .

## 2018-05-04 NOTE — BRIEF OP NOTE
[facility]    Brief Operative Note    Pre-operative diagnosis: AQUIRED PTOSIS OF EYELID BILATERAL, BROW PTOSIS  Post-operative diagnosis * No post-op diagnosis entered *  Procedure: Procedure(s):  BILATERAL UPPERLID BLEPHAROPLASTY, PTOSIS REPAIR, DIRECT BROW PTOSIS REPAIR) (MAC LOCAL) - Wound Class: I-Clean   - Wound Class: I-Clean  Surgeon: Surgeon(s) and Role:     * Bobby Swann MD - Primary  Anesthesia: Combined MAC with Local   Estimated blood loss: Minimal  Drains: None  Specimens: * No specimens in log *  Findings:   None.  Complications: None.  Implants: None.

## 2018-05-04 NOTE — IP AVS SNAPSHOT
MRN:[id number]                      After Visit Summary   5/4/2018    Yordan Nolan    MRN: [id number]           Thank you!     Thank you for choosing [facility] for your care. Our goal is always to provide you with excellent care. Hearing back from our patients is one way we can continue to improve our services. Please take a few minutes to complete the written survey that you may receive in the mail after you visit with us. Thank you!        Patient Information     Date Of Birth          1945        About your hospital stay     You were admitted on:  May 4, 2018 [facility]    You were discharged on:  May 4, 2018       Who to Call     For medical emergencies, please call 911.  For non-urgent questions about your medical care, please call your primary care provider or clinic, [phone]  For questions related to your surgery, please call your surgery clinic        Attending Provider     Provider Specialty    Bobby Swann MD Ophthalmology       Primary Care Provider Office Phone # Fax #    Joe Johnson [phone]      After Care Instructions     Discharge Medication Instructions       Do NOT take aspirin or medications containing NSAIDS for 72 hours after procedure.            Ice to affected area       Apply cold pack for 15 minutes on, 15 minutes off, for 48 hours while awake.                  Your next 10 appointments already scheduled     May 22, 2018 11:00 AM CDT   (Arrive by 10:45 AM)   Post-Op with Bobby Swann MD   [facility] ([facility])    [address]  [address] [facility] [address] [phone]            Sep 11, 2018  1:15 PM CDT   Return Visit with Milady Colon MD   [facility]    [facility]  [address] [address] [phone]               Further instructions from your care team       Post-operative Instructions  Ophthalmic Plastic and Reconstructive Surgery    Bobby Swann M.D.     All instructions apply to the operated eye(s) or eyelid(s).    Wound care and personal care  ? If a patch or bandage has been placed, please leave this in place until seen by your physician. Ensure that the bandage does not get wet when you take a shower.  ? Apply ice compresses 15 minutes on 15 minutes off while awake for 2 days, then switch to warm water compresses 4 times a day until seen by your physician. For warm packs you can place a cup of dry uncooked rice in a clean cotton sock. Then place sock in microwave 30 seconds to one minute. Next place the warm sock into a plastic bag and wrap the bag with clean warm wet washcloth and place over operated eye.    ? You may shower or wash your hair the day after surgery. Do not bathe or go swimming for 1 week to prevent contamination of your wounds.  ? Do not apply make-up to the eyes or eyelids for 2 weeks after surgery.  ? Expect some swelling, bruising, black eye (even into the lower eyelids and cheeks). Also expect serum caking, crusting and discharge from the eye and/or incisions. A small amount of surface bleeding is normal for the first 48 hours.  ? Your eye(s) and eyelid(s) may be painful and tender. This is normal after surgery.  Use the pain medication as prescribed. If your pain does not improve despite the  medication, contact the office.    Contact information and follow-up  ? Return to the Eye Clinic for a follow-up appointment with your physician as  scheduled. If no appointment has been scheduled:   - [facility] eye clinic: [phone] for an appointment with Dr. Swann within 1 to 2 weeks from your date of surgery.   -  [facility] eye clinic: [phone] for an appointment with Dr. Swann within 1 to 2 weeks from your date of surgery.     ? For severe  pain, bleeding, or loss of vision, call the [facility] at [phone] or [facility] at [phone].     After hours or on weekends and holidays, call [phone] and ask to speak with the ophthalmologist on call.    An on call person can be reached after hours for concerns. The on call doctor should not call in medication refill requests after hours or on weekends, so please plan accordingly. An effort has been made to provide adequate pain medications following every surgery, and refills will not be provided in most instances. Narcotic pain medications cannot be called in.     Activity restrictions and driving  ? Avoid heavy lifting, bending, exercise or strenuous activity for 1 week after surgery.  You may resume other activities and return to work as tolerated.  ? You may not resume driving until have you stopped using narcotic pain medications (such as Norco, Percocet, Tylenol #3).    Medications  ? Restart all your regular home medications and eye drops. If you take Plavix or  Aspirin on a regular basis, wait for 72 hours after your surgery before restarting these in order to decrease the risk of bleeding complications.  ? Avoid aspirin and aspirin-like medications (Motrin, Aleve, Ibuprofen, Carlene-  Beaver etc) for 72 hours to reduce the risk of bleeding. You may take Tylenol  (acetaminophen) for pain.  ? In addition to your home medications, take the following post-operative medications as prescribed by your physician.    ? Apply antibiotic ointment to all sutures three times a day, and into the operated eye(s) at night.  ? Take pain pills at prescribed frequency as needed for pain.    ? WARNING: All the prescription pain medications listed above contain Tylenol  (acetaminophen). You must not take more than 4,000 mg of acetaminophen per  24-hour period. This is equivalent to 6 tablets of Darvocet, 12 tablets of Norco, Percocet or Tylenol #3. If you take other  over-the-counter medications containing acetaminophen, you must take the amount of acetaminophen into account and reduce the number of prescribed pain pills accordingly.  ? The prescribed medications may make you drowsy. You must not drive a car,  operate heavy machinery or drink alcohol while taking them.  ? The prescribed pain medications may cause constipation and nausea. Take them  with some food to prevent a stomach upset. If you continue to experience nausea,  call your physician.      Same Day Surgery Discharge Instructions for  Sedation and General Anesthesia       It's not unusual to feel dizzy, light-headed or faint for up to 24 hours after surgery or while taking pain medication.  If you have these symptoms: sit for a few minutes before standing and have someone assist you when you get up to walk or use the bathroom.      You should rest and relax for the next 24 hours. We recommend you make arrangements to have an adult stay with you for at least 24 hours after your discharge.  Avoid hazardous and strenuous activity.      DO NOT DRIVE any vehicle or operate mechanical equipment for 24 hours following the end of your surgery.  Even though you may feel normal, your reactions may be affected by the medication you have received.      Do not drink alcoholic beverages for 24 hours following surgery.       Slowly progress to your regular diet as you feel able. It's not unusual to feel nauseated and/or vomit after receiving anesthesia.  If you develop these symptoms, drink clear liquids (apple juice, ginger ale, broth, 7-up, etc. ) until you feel better.  If your nausea and vomiting persists for 24 hours, please notify your surgeon.        All narcotic pain medications, along with inactivity and anesthesia, can cause constipation. Drinking plenty of liquids and increasing fiber intake will help.      For any questions of a medical nature, call your surgeon.      Do not make important decisions for 24  hours.      If you had general anesthesia, you may have a sore throat for a couple of days related to the breathing tube used during surgery.  You may use Cepacol lozenges to help with this discomfort.  If it worsens or if you develop a fever, contact your surgeon.       If you feel your pain is not well managed with the pain medications prescribed by your surgeon, please contact your surgeon's office to let them know so they can address your concerns.             Pending Results     No orders found from 5/2/2018 to 5/5/2018.            Admission Information     Date & Time Provider Department Dept. Phone    5/4/2018 Bobby Swann MD [facility] Same Day Surgery [phone]      Your Vitals Were     Blood Pressure Temperature Respirations Height Weight Pulse Oximetry    173/97 97.2  F (36.2  C) (Temporal) 22 1.829 m (6') 109.4 kg (241 lb 4 oz) 93%    BMI (Body Mass Index)                   32.72 kg/m2           [email or web address] Information     [employer] gives you secure access to your electronic health record. If you see a primary care provider, you can also send messages to your care team and make appointments. If you have questions, please call your primary care clinic.  If you do not have a primary care provider, please call [phone] and they will assist you.        Care EveryWhere ID     This is your Care EveryWhere ID. This could be used by other organizations to access your [facility] medical records  [id number]        Equal Access to Services     EVELYN PAULINO : Hadii sherin Sofia, waaxda luqadaha, qaybta kaalmada isha, delfin zavala. So [facility] [phone].    ATENCIÓN: Si habla español, tiene a ritter disposición servicios gratuitos de asistencia lingüística. Llame al [phone].    We comply with applicable federal civil rights laws and [place] laws. We do not discriminate on the basis of race, color, national origin, age, disability, sex, sexual orientation,  or gender identity.               Review of your medicines      UNREVIEWED medicines. Ask your doctor about these medicines        Dose / Directions    acetaminophen 500 MG tablet   Commonly known as:  TYLENOL        Dose:  1000 mg   Take 1,000 mg by mouth   Refills:  0       apixaban ANTICOAGULANT 5 MG tablet   Commonly known as:  ELIQUIS        Dose:  5 mg   Take 5 mg by mouth 2 times daily   Refills:  0       ferrous sulfate 325 (65 Fe) MG tablet   Commonly known as:  IRON        Dose:  325 mg   Take 325 mg by mouth daily (with breakfast)   Refills:  0       gabapentin 300 MG capsule   Commonly known as:  NEURONTIN        Dose:  300 mg   Take 300 mg by mouth 3 times daily   Refills:  0       latanoprost 0.005 % ophthalmic solution   Commonly known as:  XALATAN   Used for:  Primary open angle glaucoma of both eyes, mild stage        Dose:  1 drop   Place 1 drop into both eyes At Bedtime   Quantity:  10 mL   Refills:  4       levothyroxine 50 MCG tablet   Commonly known as:  SYNTHROID/LEVOTHROID        Dose:  50 mcg   Take 50 mcg by mouth daily   Refills:  0       metoprolol succinate 25 MG 24 hr tablet   Commonly known as:  TOPROL-XL        Dose:  25 mg   Take 25 mg by mouth daily   Refills:  0       MIRAPEX 0.25 MG tablet   Generic drug:  pramipexole        Dose:  0.125 mg   Take 0.125 mg by mouth At Bedtime   Refills:  0       pravastatin 20 MG tablet   Commonly known as:  PRAVACHOL        Dose:  20 mg   Take 20 mg by mouth At Bedtime   Refills:  0       traMADol 50 MG tablet   Commonly known as:  ULTRAM        TAKE 1 TABLET BY MOUTH EVERY 6 HOURS AS NEEDED FOR PAIN (HEADACHE)   Refills:  0       triamterene-hydrochlorothiazide 37.5-25 MG per capsule   Commonly known as:  DYAZIDE        Dose:  1 capsule   Take 1 capsule by mouth daily   Refills:  0         START taking        Dose / Directions    erythromycin ophthalmic ointment   Commonly known as:  ROMYCIN   Used for:  Postoperative state        Apply small  amount to incision sites three times daily   Quantity:  3.5 g   Refills:  0       HYDROcodone-acetaminophen 5-325 MG per tablet   Commonly known as:  NORCO   Used for:  Postoperative state   Notes to Patient:  One tablet taken at 10:25am        Dose:  1 tablet   Take 1 tablet by mouth every 6 hours as needed for pain Maximum of 4000 mg of acetaminophen in 24 hours.   Quantity:  10 tablet   Refills:  0       neomycin-polymyxin-dexamethasone 3.5-58412-1.1 Susp ophthalmic susp   Commonly known as:  MAXITROL   Used for:  Postoperative state        Dose:  1 drop   Place 1 drop into both eyes 4 times daily for 10 days   Quantity:  2 mL   Refills:  0            Where to get your medicines      These medications were sent to [facility] - [address]  [address] [facility] [facility] [address], [place] [address]     Phone:  [phone]     erythromycin ophthalmic ointment    neomycin-polymyxin-dexamethasone 3.5-08102-7.1 Susp ophthalmic susp         Some of these will need a paper prescription and others can be bought over the counter. Ask your nurse if you have questions.     Bring a paper prescription for each of these medications     HYDROcodone-acetaminophen 5-325 MG per tablet                Protect others around you: Learn how to safely use, store and throw away your medicines at www.disposemymeds.org.        Information about OPIOIDS     PRESCRIPTION OPIOIDS: WHAT YOU NEED TO KNOW   You have a prescription for an opioid (narcotic) pain medicine. Opioids can cause addiction. If you have a history of chemical dependency of any type, you are at a higher risk of becoming addicted to opioids. Only take this medicine after all other options have been tried. Take it for as short a time and as few doses as possible.     Do not:    Drive. If you drive while taking these medicines, you could be arrested for driving under the influence (DUI).    Operate heavy machinery    Do any other dangerous activities while  taking these medicines.     Drink any alcohol while taking these medicines.      Take with any other medicines that contain acetaminophen. Read all labels carefully. Look for the word  acetaminophen  or  Tylenol.  Ask your pharmacist if you have questions or are unsure.    Store your pills in a secure place, locked if possible. We will not replace any lost or stolen medicine. If you don t finish your medicine, please throw away (dispose) as directed by your pharmacist. The [place] Pollution Control Agency has more information about safe disposal: https://www.pca.[facility].[place].us/living-green/managing-unwanted-medications    All opioids tend to cause constipation. Drink plenty of water and eat foods that have a lot of fiber, such as fruits, vegetables, prune juice, apple juice and high-fiber cereal. Take a laxative (Miralax, milk of magnesia, Colace, Senna) if you don t move your bowels at least every other day.              Medication List: This is a list of all your medications and when to take them. Check marks below indicate your daily home schedule. Keep this list as a reference.      Medications           Morning Afternoon Evening Bedtime As Needed    acetaminophen 500 MG tablet   Commonly known as:  TYLENOL   Take 1,000 mg by mouth                                apixaban ANTICOAGULANT 5 MG tablet   Commonly known as:  ELIQUIS   Take 5 mg by mouth 2 times daily                                erythromycin ophthalmic ointment   Commonly known as:  ROMYCIN   Apply small amount to incision sites three times daily   Last time this was given:  2 g on 5/4/2018  9:14 AM                                ferrous sulfate 325 (65 Fe) MG tablet   Commonly known as:  IRON   Take 325 mg by mouth daily (with breakfast)                                gabapentin 300 MG capsule   Commonly known as:  NEURONTIN   Take 300 mg by mouth 3 times daily                                HYDROcodone-acetaminophen 5-325 MG per tablet   Commonly  known as:  NORCO   Take 1 tablet by mouth every 6 hours as needed for pain Maximum of 4000 mg of acetaminophen in 24 hours.   Notes to Patient:  One tablet taken at 10:25am                                latanoprost 0.005 % ophthalmic solution   Commonly known as:  XALATAN   Place 1 drop into both eyes At Bedtime                                levothyroxine 50 MCG tablet   Commonly known as:  SYNTHROID/LEVOTHROID   Take 50 mcg by mouth daily                                metoprolol succinate 25 MG 24 hr tablet   Commonly known as:  TOPROL-XL   Take 25 mg by mouth daily                                MIRAPEX 0.25 MG tablet   Take 0.125 mg by mouth At Bedtime   Generic drug:  pramipexole                                neomycin-polymyxin-dexamethasone 3.5-60291-4.1 Susp ophthalmic susp   Commonly known as:  MAXITROL   Place 1 drop into both eyes 4 times daily for 10 days                                pravastatin 20 MG tablet   Commonly known as:  PRAVACHOL   Take 20 mg by mouth At Bedtime                                traMADol 50 MG tablet   Commonly known as:  ULTRAM   TAKE 1 TABLET BY MOUTH EVERY 6 HOURS AS NEEDED FOR PAIN (HEADACHE)                                triamterene-hydrochlorothiazide 37.5-25 MG per capsule   Commonly known as:  DYAZIDE   Take 1 capsule by mouth daily

## 2018-05-04 NOTE — OP NOTE
PREOPERATIVE DIAGNOSES:   1. Bilateral upper eyelid dermatochalasis.   2. Bilateral upper eyelid ptosis.   3. Bilateral brow ptosis.  POSTOPERATIVE DIAGNOSES:   1. Bilateral upper eyelid dermatochalasis.   2. Bilateral upper eyelid ptosis.   3. Bilateral brow ptosis.  PROCEDURES:  1. Bilateral upper eyelid ptosis repair by external levator resection.  2. Bilateral upper eyelid blepharoplasty.  3. Bilateral brow ptosis repair.  SURGEON: Bobby Swann MD  ASSISTANT: Russ Portillo MD   ANESTHESIA: Monitored with local infiltration of a 50/50 mixture of 2% lidocaine with epinephrine and 0.5% Marcaine.   COMPLICATIONS: None.   ESTIMATED BLOOD LOSS: Less than 5 mL.   HISTORY: Yordan Nolan presented with upper eyelid drooping secondary to dermatochalasis and ptosis of the upper eyelids leading to blockage of the superior visual field and interfering with activities of daily living. Of note we did discuss the procedure again, in clinic we had decided on mid forehead brow lift, but today he requestd to try to avoid the associated scar, thus we elected to proceed with internal browpexy. After the risks, benefits and alternatives to the proposed procedure were explained, informed consent was obtained.   PROCEDURE: The patient was brought to the operating room and placed supine on the operating table. IV sedation was given. Each upper lid crease and the excess upper eyelid skin  was marked in a blepharoplasty ellipse with a marking pen.  These areas were all infiltrated with local anesthetic and  was prepped and draped in the typical sterile fashion for oculoplastic surgery. Attention was directed to the right side. The skin was incised following the marked lines. The skin flap was excised with a high temperature cautery. Hemostasis was obtained with high temperature cautery. The orbicularis and orbital septum were opened horizontally and levator aponeurosis identified and dissected from the superior tarsal border. A  strip of pretarsal orbicularis was removed. A 5-0 Mersilene suture was placed in a horizontal mattress fashion taking a partial thickness bite of the superior tarsal border, bringing each end of the double armed suture underneath the levator aponeurosis and tied in a temporary fashion. Attention was directed to the left side where the same procedure was performed. The sutures were adjusted for symmetry then tied in a permanent fashion.    Attention was directed to the right side.  Dissection was carried in the suborbicularis plane over the orbital rim superolaterally.  A 4-0 Prolene suture was passed through the marking at the inferior brow hairs.  This suture was then passed through the frontal bone periosteum 1 cm above the orbital rim.  This suture was then passed back through the orbicularis in line with the marking stitch which was pulled through and tied in a permanent fashion.  Attention was directed to the opposite side where the same procedures were performed. Each skin incision was closed with a running 6-0 plain gut suture. Ophthalmic antibiotic ointment was applied to the incisions and into both eyes. The patient tolerated the procedure well and left the operating room in stable condition.       Bobby Swann MD

## 2018-05-04 NOTE — ANESTHESIA POSTPROCEDURE EVALUATION
Patient: Yordan Nolan    Procedure(s):  BILATERAL UPPERLID BLEPHAROPLASTY, PTOSIS REPAIR, DIRECT BROW PTOSIS REPAIR) (MAC LOCAL) - Wound Class: I-Clean   - Wound Class: I-Clean    Diagnosis:AQUIRED PTOSIS OF EYELID BILATERAL, BROW PTOSIS  Diagnosis Additional Information: No value filed.    Anesthesia Type:  MAC    Note:  Anesthesia Post Evaluation    Patient location during evaluation: PACU  Patient participation: Able to fully participate in evaluation  Level of consciousness: awake  Pain management: adequate  Airway patency: patent  Cardiovascular status: acceptable  Respiratory status: acceptable  Hydration status: acceptable  PONV: controlled     Anesthetic complications: None          Last vitals:  Vitals:    05/04/18 0807 05/04/18 1010 05/04/18 1015   BP: 155/83 (!) 167/99 (!) 173/97   Resp: 16 10 22   Temp: 36.3  C (97.3  F)  36.2  C (97.2  F)   SpO2: 98% 94% 93%         Electronically Signed By: Roel Terry MD  May 4, 2018  10:36 AM

## 2018-05-04 NOTE — ANESTHESIA CARE TRANSFER NOTE
Patient: Yordan Nolan    Procedure(s):  BILATERAL UPPERLID BLEPHAROPLASTY, PTOSIS REPAIR, DIRECT BROW PTOSIS REPAIR) (MAC LOCAL) - Wound Class: I-Clean   - Wound Class: I-Clean    Diagnosis: AQUIRED PTOSIS OF EYELID BILATERAL, BROW PTOSIS  Diagnosis Additional Information: No value filed.    Anesthesia Type:   MAC     Note:  Airway :Nasal Cannula  Patient transferred to:PACU  Comments: At end of procedure, spontaneous respirations, patient alert to voice, able to follow commands. Oxygen via nasal cannula at 2 liters per minute to PACU. Oxygen tubing connected to wall O2 in PACU, SpO2, NiBP, and EKG monitors and alarms on and functioning, Christal Hugger warmer connected to patient gown, report on patient's clinical status given to PACU RN, RN questions answered.Handoff Report: Identifed the Patient, Identified the Reponsible Provider, Reviewed the pertinent medical history, Discussed the surgical course, Reviewed Intra-OP anesthesia mangement and issues during anesthesia, Set expectations for post-procedure period and Allowed opportunity for questions and acknowledgement of understanding      Vitals: (Last set prior to Anesthesia Care Transfer)    CRNA VITALS  5/4/2018 0930 - 5/4/2018 1000      5/4/2018             Pulse: 54    Ht Rate: 53    SpO2: 90 %    Resp Rate (set): 10                Electronically Signed By: CHAPITO Blanchard CRNA  May 4, 2018  10:00 AM

## 2018-05-04 NOTE — IP AVS SNAPSHOT
[facility]    [address]    [place] [address]    Phone:  [phone]    Fax:  [phone]                                       After Visit Summary   5/4/2018    Yordan Nolan    MRN: [id number]           After Visit Summary Signature Page     I have received my discharge instructions, and my questions have been answered. I have discussed any challenges I see with this plan with the nurse or doctor.    ..........................................................................................................................................  Patient/Patient Representative Signature      ..........................................................................................................................................  Patient Representative Print Name and Relationship to Patient    ..................................................               ................................................  Date                                            Time    ..........................................................................................................................................  Reviewed by Signature/Title    ...................................................              ..............................................  Date                                                            Time

## 2018-05-04 NOTE — DISCHARGE INSTRUCTIONS
Post-operative Instructions  Ophthalmic Plastic and Reconstructive Surgery    Bobby Swann M.D.     All instructions apply to the operated eye(s) or eyelid(s).    Wound care and personal care  ? If a patch or bandage has been placed, please leave this in place until seen by your physician. Ensure that the bandage does not get wet when you take a shower.  ? Apply ice compresses 15 minutes on 15 minutes off while awake for 2 days, then switch to warm water compresses 4 times a day until seen by your physician. For warm packs you can place a cup of dry uncooked rice in a clean cotton sock. Then place sock in microwave 30 seconds to one minute. Next place the warm sock into a plastic bag and wrap the bag with clean warm wet washcloth and place over operated eye.    ? You may shower or wash your hair the day after surgery. Do not bathe or go swimming for 1 week to prevent contamination of your wounds.  ? Do not apply make-up to the eyes or eyelids for 2 weeks after surgery.  ? Expect some swelling, bruising, black eye (even into the lower eyelids and cheeks). Also expect serum caking, crusting and discharge from the eye and/or incisions. A small amount of surface bleeding is normal for the first 48 hours.  ? Your eye(s) and eyelid(s) may be painful and tender. This is normal after surgery.  Use the pain medication as prescribed. If your pain does not improve despite the  medication, contact the office.    Contact information and follow-up  ? Return to the Eye Clinic for a follow-up appointment with your physician as  scheduled. If no appointment has been scheduled:   - [facility] eye clinic: [phone] for an appointment with Dr. Swann within 1 to 2 weeks from your date of surgery.   -  [facility] eye clinic: [phone] for an appointment with Dr. Swann within 1 to 2 weeks from your date of surgery.     ? For severe pain, bleeding, or loss of vision, call the [facility]  [place] [facility] at [phone] or [facility] at [phone].     After hours or on weekends and holidays, call [phone] and ask to speak with the ophthalmologist on call.    An on call person can be reached after hours for concerns. The on call doctor should not call in medication refill requests after hours or on weekends, so please plan accordingly. An effort has been made to provide adequate pain medications following every surgery, and refills will not be provided in most instances. Narcotic pain medications cannot be called in.     Activity restrictions and driving  ? Avoid heavy lifting, bending, exercise or strenuous activity for 1 week after surgery.  You may resume other activities and return to work as tolerated.  ? You may not resume driving until have you stopped using narcotic pain medications (such as Norco, Percocet, Tylenol #3).    Medications  ? Restart all your regular home medications and eye drops. If you take Plavix or  Aspirin on a regular basis, wait for 72 hours after your surgery before restarting these in order to decrease the risk of bleeding complications.  ? Avoid aspirin and aspirin-like medications (Motrin, Aleve, Ibuprofen, Carlene-  [place] etc) for 72 hours to reduce the risk of bleeding. You may take Tylenol  (acetaminophen) for pain.  ? In addition to your home medications, take the following post-operative medications as prescribed by your physician.    ? Apply antibiotic ointment to all sutures three times a day, and into the operated eye(s) at night.  ? Take pain pills at prescribed frequency as needed for pain.    ? WARNING: All the prescription pain medications listed above contain Tylenol  (acetaminophen). You must not take more than 4,000 mg of acetaminophen per  24-hour period. This is equivalent to 6 tablets of Darvocet, 12 tablets of Norco, Percocet or Tylenol #3. If you take other over-the-counter medications containing acetaminophen, you must  take the amount of acetaminophen into account and reduce the number of prescribed pain pills accordingly.  ? The prescribed medications may make you drowsy. You must not drive a car,  operate heavy machinery or drink alcohol while taking them.  ? The prescribed pain medications may cause constipation and nausea. Take them  with some food to prevent a stomach upset. If you continue to experience nausea,  call your physician.      Same Day Surgery Discharge Instructions for  Sedation and General Anesthesia       It's not unusual to feel dizzy, light-headed or faint for up to 24 hours after surgery or while taking pain medication.  If you have these symptoms: sit for a few minutes before standing and have someone assist you when you get up to walk or use the bathroom.      You should rest and relax for the next 24 hours. We recommend you make arrangements to have an adult stay with you for at least 24 hours after your discharge.  Avoid hazardous and strenuous activity.      DO NOT DRIVE any vehicle or operate mechanical equipment for 24 hours following the end of your surgery.  Even though you may feel normal, your reactions may be affected by the medication you have received.      Do not drink alcoholic beverages for 24 hours following surgery.       Slowly progress to your regular diet as you feel able. It's not unusual to feel nauseated and/or vomit after receiving anesthesia.  If you develop these symptoms, drink clear liquids (apple juice, ginger ale, broth, 7-up, etc. ) until you feel better.  If your nausea and vomiting persists for 24 hours, please notify your surgeon.        All narcotic pain medications, along with inactivity and anesthesia, can cause constipation. Drinking plenty of liquids and increasing fiber intake will help.      For any questions of a medical nature, call your surgeon.      Do not make important decisions for 24 hours.      If you had general anesthesia, you may have a sore throat for a  couple of days related to the breathing tube used during surgery.  You may use Cepacol lozenges to help with this discomfort.  If it worsens or if you develop a fever, contact your surgeon.       If you feel your pain is not well managed with the pain medications prescribed by your surgeon, please contact your surgeon's office to let them know so they can address your concerns.

## 2018-05-22 ENCOUNTER — OFFICE VISIT (OUTPATIENT)
Dept: OPHTHALMOLOGY | Facility: CLINIC | Age: 73
End: 2018-05-22
Payer: MEDICARE

## 2018-05-22 DIAGNOSIS — H02.833 DERMATOCHALASIS OF EYELIDS OF BOTH EYES: Primary | ICD-10-CM

## 2018-05-22 DIAGNOSIS — H02.836 DERMATOCHALASIS OF EYELIDS OF BOTH EYES: Primary | ICD-10-CM

## 2018-05-22 DIAGNOSIS — H02.403 ACQUIRED PTOSIS OF EYELID, BILATERAL: ICD-10-CM

## 2018-05-22 ASSESSMENT — VISUAL ACUITY
OS_CC+: -2
CORRECTION_TYPE: GLASSES
OD_CC+: -1
OD_CC: 20/30
METHOD: SNELLEN - LINEAR
OS_CC: 20/20

## 2018-05-22 ASSESSMENT — TONOMETRY
IOP_METHOD: ICARE
OD_IOP_MMHG: 16
OS_IOP_MMHG: 16

## 2018-05-22 NOTE — PROGRESS NOTES
Doing well postop bilateral upper eyelid blepharoplasty , bilateral levator advancement ptosis repair and bilateral internal browpexy. Happy with outcome.    - ointment to incision at bedtime  - continue warm packs  Return to clinic in 2 months.    Jassi Smith PGY-3  Plastic Surgery    Complete documentation of historical and exam elements from today's encounter can be found in the full encounter summary report (not reduplicated in this progress note). I personally obtained the chief complaint(s) and history of present illness.  I confirmed and edited as necessary the review of systems, past medical/surgical history, family history, social history, and examination findings as documented by others; and I examined the patient myself. I personally reviewed the relevant tests, images, and reports as documented above. I formulated and edited as necessary the assessment and plan and discussed the findings and management plan with the patient and family. - Bobby Swann MD

## 2018-05-22 NOTE — MR AVS SNAPSHOT
After Visit Summary   5/22/2018    Yordan Nolan    MRN: [id number]           Patient Information     Date Of Birth          1945        Visit Information        Provider Department      5/22/2018 11:00 AM Bobby Swann MD [facility] Ophthalmology        Today's Diagnoses     Dermatochalasis of eyelids of both eyes    -  1    Acquired ptosis of eyelid, bilateral           Follow-ups after your visit        Follow-up notes from your care team     Return in about 2 months (around 7/22/2018) for Post Op.      Your next 10 appointments already scheduled     Jul 24, 2018 10:45 AM CDT   (Arrive by 10:30 AM)   Post-Op with Bobby Swann MD   [facility]hthalmology ([facility])    [address]  4th Floor  [facility] [address] [phone]            Sep 11, 2018  1:15 PM CDT   Return Visit with Milady Colon MD   [facility])    [facility]  [address] [place] [address] [phone]              Who to contact     Please call your clinic at [phone] to:    Ask questions about your health    Make or cancel appointments    Discuss your medicines    Learn about your test results    Speak to your doctor            Additional Information About Your Visit        [employer] Information     [employer] gives you secure access to your electronic health record. If you see a primary care provider, you can also send messages to your care team and make appointments. If you have questions, please call your primary care clinic.  If you do not have a primary care provider, please call [phone] and they will assist you.      [employer] is an electronic gateway that provides easy, online access to your medical records. With [employer], you can request a clinic appointment, read your test results, renew a prescription or communicate with your care team.     To access your existing  account, please contact your [facility] or call [phone] for assistance.        Care EveryWhere ID     This is your Care EveryWhere ID. This could be used by other organizations to access your [place] medical records  [id number]         Blood Pressure from Last 3 Encounters:   05/04/18 167/88    Weight from Last 3 Encounters:   05/04/18 109.4 kg (241 lb 4 oz)   09/19/17 108 kg (238 lb)              Today, you had the following     No orders found for display       Primary Care Provider Office Phone # Fax #    Joe Johnson [phone]       [facility] [address] [address]        Equal Access to Services     EVELYN PAULINO : Hadfrancis Sofia, wadev saavedra, qaybta kaalmada adedanyelleyachristos, delfin zavala. So [facility] [phone].    ATENCIÓN: Si habla español, tiene a ritter disposición servicios gratuitos de asistencia lingüística. Llame al [phone].    We comply with applicable federal civil rights laws and [place] laws. We do not discriminate on the basis of race, color, national origin, age, disability, sex, sexual orientation, or gender identity.            Thank you!     Thank you for choosing [facility]  for your care. Our goal is always to provide you with excellent care. Hearing back from our patients is one way we can continue to improve our services. Please take a few minutes to complete the written survey that you may receive in the mail after your visit with us. Thank you!             Your Updated Medication List - Protect others around you: Learn how to safely use, store and throw away your medicines at www.disposemymeds.org.          This list is accurate as of 5/22/18 11:05 AM.  Always use your most recent med list.                   Brand Name Dispense Instructions for use Diagnosis    acetaminophen 500 MG tablet    TYLENOL     Take 1,000 mg by mouth        apixaban  ANTICOAGULANT 5 MG tablet    ELIQUIS     Take 5 mg by mouth 2 times daily        erythromycin ophthalmic ointment    ROMYCIN    3.5 g    Apply small amount to incision sites three times daily    Postoperative state       ferrous sulfate 325 (65 Fe) MG tablet    IRON     Take 325 mg by mouth daily (with breakfast)        gabapentin 300 MG capsule    NEURONTIN     Take 300 mg by mouth 3 times daily        HYDROcodone-acetaminophen 5-325 MG per tablet    NORCO    10 tablet    Take 1 tablet by mouth every 6 hours as needed for pain Maximum of 4000 mg of acetaminophen in 24 hours.    Postoperative state       latanoprost 0.005 % ophthalmic solution    XALATAN    10 mL    Place 1 drop into both eyes At Bedtime    Primary open angle glaucoma of both eyes, mild stage       levothyroxine 50 MCG tablet    SYNTHROID/LEVOTHROID     Take 50 mcg by mouth daily        metoprolol succinate 25 MG 24 hr tablet    TOPROL-XL     Take 25 mg by mouth daily        MIRAPEX 0.25 MG tablet   Generic drug:  pramipexole      Take 0.125 mg by mouth At Bedtime        pravastatin 20 MG tablet    PRAVACHOL     Take 20 mg by mouth At Bedtime        traMADol 50 MG tablet    ULTRAM     TAKE 1 TABLET BY MOUTH EVERY 6 HOURS AS NEEDED FOR PAIN (HEADACHE)        triamterene-hydrochlorothiazide 37.5-25 MG per capsule    DYAZIDE     Take 1 capsule by mouth daily

## 2018-05-22 NOTE — NURSING NOTE
Chief Complaints and History of Present Illnesses   Patient presents with     Post Op (Ophthalmology) Both Eyes     HPI    Affected eye(s):  Both   Symptoms:     No blurred vision   Itching      Frequency:  Constant       Do you have eye pain now?:  No      Comments:  18 day postop s/p Bilateral upper eyelid ptosis repair by external levator resection, Bilateral upper eyelid blepharoplasty, and Bilateral brow ptosis repair on 5/4/18.    Pt states having trouble reading and itchiness in both eyes. Pt states eyes are gradually getting better. Pt has some tenderness on the right eye brow. Refresh PRN OU.     Giancarlo Chavira COT 10:38 AM May 22, 2018

## 2018-07-24 ENCOUNTER — OFFICE VISIT (OUTPATIENT)
Dept: OPHTHALMOLOGY | Facility: CLINIC | Age: 73
End: 2018-07-24
Payer: MEDICARE

## 2018-07-24 DIAGNOSIS — H02.836 DERMATOCHALASIS OF EYELIDS OF BOTH EYES: Primary | ICD-10-CM

## 2018-07-24 DIAGNOSIS — H02.833 DERMATOCHALASIS OF EYELIDS OF BOTH EYES: Primary | ICD-10-CM

## 2018-07-24 ASSESSMENT — VISUAL ACUITY
OS_CC: 20/20
CORRECTION_TYPE: GLASSES
METHOD: SNELLEN - LINEAR
OS_CC+: -1
OD_CC: 20/20

## 2018-07-24 ASSESSMENT — TONOMETRY
OS_IOP_MMHG: 17
IOP_METHOD: ICARE
OD_IOP_MMHG: 16

## 2018-07-24 NOTE — PROGRESS NOTES
Yordan Nolan is about 2 months status post both upper eyelid blepharoplasty , bilateral levator advancement ptosis repair and bilateral internal browpexy .  He is doing well. Notes improvement in peripheral vision. Has had trouble with intermediate distance, and made an appointment with Dr. Colon for this. Tried artificial tears, does not seem to help.    Compared to preop his lid position is dramatically improved. Small peak nasally left eye, he has not noticed. No lag. No cornea staining.     He will follow up with me on an as needed basis.    Complete documentation of historical and exam elements from today's encounter can be found in the full encounter summary report (not reduplicated in this progress note). I personally obtained the chief complaint(s) and history of present illness.  I confirmed and edited as necessary the review of systems, past medical/surgical history, family history, social history, and examination findings as documented by others; and I examined the patient myself. I personally reviewed the relevant tests, images, and reports as documented above. I formulated and edited as necessary the assessment and plan and discussed the findings and management plan with the patient and family. - Bobby Swann MD

## 2018-07-24 NOTE — MR AVS SNAPSHOT
After Visit Summary   7/24/2018    Yordan Nolan    MRN: [id number]           Patient Information     Date Of Birth          1945        Visit Information        Provider Department      7/24/2018 2:15 PM Bobby Swann MD [facility] Ophthalmology        Today's Diagnoses     Dermatochalasis of eyelids of both eyes    -  1       Follow-ups after your visit        Your next 10 appointments already scheduled     Aug 07, 2018  1:45 PM CDT   Return Visit with Milady Colon MD   [facility])    [facility]  [address] [address] [phone]              Who to contact     Please call your clinic at [phone] to:    Ask questions about your health    Make or cancel appointments    Discuss your medicines    Learn about your test results    Speak to your doctor            Additional Information About Your Visit        MyChart Information     [employer] gives you secure access to your electronic health record. If you see a primary care provider, you can also send messages to your care team and make appointments. If you have questions, please call your primary care clinic.  If you do not have a primary care provider, please call [phone] and they will assist you.      [employer] is an electronic gateway that provides easy, online access to your medical records. With [employer], you can request a clinic appointment, read your test results, renew a prescription or communicate with your care team.     To access your existing account, please contact your [facility] or call [phone] for assistance.        Care EveryWhere ID     This is your Care EveryWhere ID. This could be used by other organizations to access your [place] medical records  [id number]         Blood Pressure from Last 3 Encounters:   05/04/18 167/88    Weight from Last 3 Encounters:   05/04/18 109.4 kg  (241 lb 4 oz)   09/19/17 108 kg (238 lb)              Today, you had the following     No orders found for display       Primary Care Provider Office Phone # Fax #    Joe Johnson [phone]       [facility] [address] [facility] [address]        Equal Access to Services     [facility]: Hadii aad ku hadasho Soomaali, waaxda luqadaha, qaybta kaalmada adeegyada, waxay idiin hayaan adedanyelle khrobertsh lacecilen . So [facility] [phone].    ATENCIÓN: Si habla español, tiene a ritter disposición servicios gratuitos de asistencia lingüística. Luther al [phone].    We comply with applicable federal civil rights laws and [place] laws. We do not discriminate on the basis of race, color, national origin, age, disability, sex, sexual orientation, or gender identity.            Thank you!     Thank you for choosing [facility] OPHTHALMOLOGY  for your care. Our goal is always to provide you with excellent care. Hearing back from our patients is one way we can continue to improve our services. Please take a few minutes to complete the written survey that you may receive in the mail after your visit with us. Thank you!             Your Updated Medication List - Protect others around you: Learn how to safely use, store and throw away your medicines at www.disposemymeds.org.          This list is accurate as of 7/24/18  2:26 PM.  Always use your most recent med list.                   Brand Name Dispense Instructions for use Diagnosis    acetaminophen 500 MG tablet    TYLENOL     Take 1,000 mg by mouth        apixaban ANTICOAGULANT 5 MG tablet    ELIQUIS     Take 5 mg by mouth 2 times daily        erythromycin ophthalmic ointment    ROMYCIN    3.5 g    Apply small amount to incision sites three times daily    Postoperative state       ferrous sulfate 325 (65 Fe) MG tablet    IRON     Take 325 mg by mouth daily (with breakfast)        gabapentin 300 MG capsule    NEURONTIN     Take 300 mg by mouth 3 times  daily        HYDROcodone-acetaminophen 5-325 MG per tablet    NORCO    10 tablet    Take 1 tablet by mouth every 6 hours as needed for pain Maximum of 4000 mg of acetaminophen in 24 hours.    Postoperative state       latanoprost 0.005 % ophthalmic solution    XALATAN    10 mL    Place 1 drop into both eyes At Bedtime    Primary open angle glaucoma of both eyes, mild stage       levothyroxine 50 MCG tablet    SYNTHROID/LEVOTHROID     Take 50 mcg by mouth daily        metoprolol succinate 25 MG 24 hr tablet    TOPROL-XL     Take 25 mg by mouth daily        MIRAPEX 0.25 MG tablet   Generic drug:  pramipexole      Take 0.125 mg by mouth At Bedtime        pravastatin 20 MG tablet    PRAVACHOL     Take 20 mg by mouth At Bedtime        traMADol 50 MG tablet    ULTRAM     TAKE 1 TABLET BY MOUTH EVERY 6 HOURS AS NEEDED FOR PAIN (HEADACHE)        triamterene-hydrochlorothiazide 37.5-25 MG per capsule    DYAZIDE     Take 1 capsule by mouth daily

## 2018-07-24 NOTE — NURSING NOTE
Chief Complaints and History of Present Illnesses   Patient presents with     Post Op (Ophthalmology) Both Eyes     HPI    Affected eye(s):  Both   Symptoms:     No blurred vision   Itching      Frequency:  Constant       Do you have eye pain now?:  No      Comments:  2.5 month postop s/p Bilateral upper eyelid ptosis repair by external levator resection, Bilateral upper eyelid blepharoplasty and Bilateral brow ptosis repair on 5/4/2018. Pt states healing really well, some pain where incision was made- but very little. Pt has occasional itching. Done with drops.    Giancarlo Chavira COT 2:13 PM July 24, 2018

## 2018-08-07 ENCOUNTER — OFFICE VISIT (OUTPATIENT)
Dept: OPHTHALMOLOGY | Facility: CLINIC | Age: 73
End: 2018-08-07
Payer: MEDICARE

## 2018-08-07 DIAGNOSIS — H40.1131 PRIMARY OPEN ANGLE GLAUCOMA OF BOTH EYES, MILD STAGE: Primary | ICD-10-CM

## 2018-08-07 DIAGNOSIS — H52.4 PRESBYOPIA: ICD-10-CM

## 2018-08-07 DIAGNOSIS — H52.203 MYOPIC ASTIGMATISM OF BOTH EYES: ICD-10-CM

## 2018-08-07 DIAGNOSIS — H02.836 DERMATOCHALASIS OF EYELIDS OF BOTH EYES: ICD-10-CM

## 2018-08-07 DIAGNOSIS — H52.13 MYOPIC ASTIGMATISM OF BOTH EYES: ICD-10-CM

## 2018-08-07 DIAGNOSIS — H02.833 DERMATOCHALASIS OF EYELIDS OF BOTH EYES: ICD-10-CM

## 2018-08-07 DIAGNOSIS — Z96.1 PSEUDOPHAKIA, BOTH EYES: ICD-10-CM

## 2018-08-07 ASSESSMENT — VISUAL ACUITY
CORRECTION_TYPE: GLASSES
OS_CC: 20/20+2
OD_CC: 20/15-
METHOD: SNELLEN - LINEAR

## 2018-08-07 ASSESSMENT — CONF VISUAL FIELD
OD_NORMAL: 1
METHOD: COUNTING FINGERS
OS_NORMAL: 1

## 2018-08-07 ASSESSMENT — TONOMETRY
OS_IOP_MMHG: 16
OD_IOP_MMHG: 16
IOP_METHOD: APPLANATION

## 2018-08-07 ASSESSMENT — REFRACTION_WEARINGRX
OS_ADD: +2.75
OD_CYLINDER: +1.25
OD_AXIS: 160
OS_AXIS: 179
OS_CYLINDER: +3.75
OD_SPHERE: -3.00
SPECS_TYPE: PAL
OD_ADD: +2.75
OS_SPHERE: -0.75

## 2018-08-07 ASSESSMENT — CUP TO DISC RATIO
OS_RATIO: 0.45
OD_RATIO: 0.55

## 2018-08-07 ASSESSMENT — EXTERNAL EXAM - RIGHT EYE: OD_EXAM: BROW PTOSIS, WITH FRONTALIS RELAXED, BROW IS BELOW SUPERIOR ORBITAL RIM AND LATERALLY INLINE WITH UPPER LASHES

## 2018-08-07 ASSESSMENT — SLIT LAMP EXAM - LIDS: COMMENTS: HEAVY DERMATOCHALASIS RESTING ON LASHES, TRUE PTOSIS

## 2018-08-07 NOTE — MR AVS SNAPSHOT
After Visit Summary   8/7/2018    Yordan Nolan    MRN: [id number]           Patient Information     Date Of Birth          1945        Visit Information        Provider Department      8/7/2018 1:45 PM Milady Colon MD [facility]  [facility]         Follow-ups after your visit        Follow-up notes from your care team     Return in about 6 months (around 2/7/2019) for applanation IOP, OVF 24-2, dilation, nerve OCT OU.      Your next 10 appointments already scheduled     Feb 05, 2019  1:15 PM CST   Return Visit with Milady Colon MD   [facility]  [facility] ([facility])    [facility]  [address] [address] [phone]              Who to contact     Please call your clinic at [phone] to:    Ask questions about your health    Make or cancel appointments    Discuss your medicines    Learn about your test results    Speak to your doctor            Additional Information About Your Visit        [employer] Information     [employer] gives you secure access to your electronic health record. If you see a primary care provider, you can also send messages to your care team and make appointments. If you have questions, please call your primary care clinic.  If you do not have a primary care provider, please call [phone] and they will assist you.      [employer] is an electronic gateway that provides easy, online access to your medical records. With [employer], you can request a clinic appointment, read your test results, renew a prescription or communicate with your care team.     To access your existing account, please contact your [facility] or call [phone] for assistance.        Care EveryWhere ID     This is your Care EveryWhere ID. This could be used by other organizations to access your [place] medical records  [id number]         Blood Pressure from Last 3  Encounters:   05/04/18 167/88    Weight from Last 3 Encounters:   05/04/18 109.4 kg (241 lb 4 oz)   09/19/17 108 kg (238 lb)              Today, you had the following     No orders found for display       Primary Care Provider Office Phone # Fax #    Joe Johnson [phone]       [place] [address] [place] [address]        Equal Access to Services     [facility] : Hadii aad ku hadasho Soomaali, waaxda luqadaha, qaybta kaalmada adeegyada, waxay idiin hayaan adeeg guillermo torres . So [facility] [phone].    ATENCIÓN: Si kristie viera, tiene a ritter disposición servicios gratuitos de asistencia lingüística. [facility] [phone].    We comply with applicable federal civil rights laws and [place] laws. We do not discriminate on the basis of race, color, national origin, age, disability, sex, sexual orientation, or gender identity.            Thank you!     Thank you for choosing [facility]  for your care. Our goal is always to provide you with excellent care. Hearing back from our patients is one way we can continue to improve our services. Please take a few minutes to complete the written survey that you may receive in the mail after your visit with us. Thank you!             Your Updated Medication List - Protect others around you: Learn how to safely use, store and throw away your medicines at www.disposemymeds.org.          This list is accurate as of 8/7/18  3:05 PM.  Always use your most recent med list.                   Brand Name Dispense Instructions for use Diagnosis    acetaminophen 500 MG tablet    TYLENOL     Take 1,000 mg by mouth        apixaban ANTICOAGULANT 5 MG tablet    ELIQUIS     Take 5 mg by mouth 2 times daily        erythromycin ophthalmic ointment    ROMYCIN    3.5 g    Apply small amount to incision sites three times daily    Postoperative state       ferrous sulfate 325 (65 Fe) MG tablet    IRON     Take 325 mg by mouth  daily (with breakfast)        gabapentin 300 MG capsule    NEURONTIN     Take 300 mg by mouth 3 times daily        HYDROcodone-acetaminophen 5-325 MG per tablet    NORCO    10 tablet    Take 1 tablet by mouth every 6 hours as needed for pain Maximum of 4000 mg of acetaminophen in 24 hours.    Postoperative state       latanoprost 0.005 % ophthalmic solution    XALATAN    10 mL    Place 1 drop into both eyes At Bedtime    Primary open angle glaucoma of both eyes, mild stage       levothyroxine 50 MCG tablet    SYNTHROID/LEVOTHROID     Take 50 mcg by mouth daily        metoprolol succinate 25 MG 24 hr tablet    TOPROL-XL     Take 25 mg by mouth daily        MIRAPEX 0.25 MG tablet   Generic drug:  pramipexole      Take 0.125 mg by mouth At Bedtime        pravastatin 20 MG tablet    PRAVACHOL     Take 20 mg by mouth At Bedtime        traMADol 50 MG tablet    ULTRAM     TAKE 1 TABLET BY MOUTH EVERY 6 HOURS AS NEEDED FOR PAIN (HEADACHE)        triamterene-hydrochlorothiazide 37.5-25 MG per capsule    DYAZIDE     Take 1 capsule by mouth daily

## 2018-08-07 NOTE — PROGRESS NOTES
HPI  Yordan Nolan is a 73 year old male here for IOP check. He has been having trouble with his current glasses. If he turns his head, he sees better out of the right lens. He also gets shadowing that gets worse the further down the lens he looks. He had eyelid surgery with Dr. Swann and is pleased with the results.     He is doing well using latanoprost in both eyes at night.     Assessment & Plan      (H40.11X1) Primary open angle glaucoma of both eyes, mild stage  (primary encounter diagnosis)  Comment: Previously followed by Dr. Douglas. S/p SLT OU 11/2015.    FH: Negative  Pachymetry:  Gonioscopy:  Tmax:   Today's IOP: 16/16  Target IOP: 19 or less (per patient)  Current medications: Latanoprost OU QHS  Meds to avoid:  Visual field:OVF 24-2 3/27/18  OD: Generalized depression (had trouble with the machine today)  OS: Superotemporal defect (lid defect vs superior arcuate vs secondary to CVA), MD -5.1, PSD 4.5 (stable)  Nerve OCT: Spectralis optic nerve OCT (3/27/18)  OD: Avg RNFL thickness 73 (last 70), red superior, inferior yellow  OS: Poor quality scan, Avg RNFL thickness 77 (last 79), red inferior and superior    IOP good today with stable ONH appearance - tilted with history of high myopia    Plan: Continue current regimen. Return to clinic in 6 months for applanation, no dilation    (H02.831,  H02.834) Dermatochalasis of both upper eyelids  Comment: S/p bilateral ptosis repair with Dr. Swann. He is pleased with the result.   Plan: Observe    (I63.9) Cerebrovascular accident (CVA), unspecified (HCC)  Comment: Found to have had a small stroke in 2016 which the neurologist said was in an area that processes vision. Stable VF testing last visit.  Plan: He is now following with neurology and cardiology.    (Z96.1) Pseudophakia, both eyes  Comment: Good acuity, mild PCO OD  Plan: Observe    (H52.203,  H52.13) Myopic astigmatism of both eyes  (H52.4) Presbyopia  Comment: Good vision with  refraction today.  Plan: Given updated glasses Rx, no slab off     -----------------------------------------------------------------------------------    Patient disposition:   Return in about 6 months (around 2/7/2019) for applanation IOP, OVF 24-2, dilation, nerve OCT OU. or sooner as needed.    Teaching statement:  Complete documentation of historical and exam elements from today's encounter can be found in the full encounter summary report (not reduplicated in this progress note). I personally obtained the chief complaint(s) and history of present illness.  I confirmed and edited as necessary the review of systems, past medical/surgical history, family history, social history, and examination findings as documented by others; and I examined the patient myself. I personally reviewed the relevant tests, images, and reports as documented above.     I formulated and edited as necessary the assessment and plan and discussed the findings and management plan with the patient and family.      Milady Colon MD  Comprehensive Ophthalmology & Ocular Pathology  Department of Ophthalmology and Visual Neurosciences  [email or web address]  Pager [phone]

## 2019-02-26 ENCOUNTER — OFFICE VISIT (OUTPATIENT)
Dept: OPHTHALMOLOGY | Facility: CLINIC | Age: 74
End: 2019-02-26
Payer: MEDICARE

## 2019-02-26 DIAGNOSIS — H52.4 PRESBYOPIA: ICD-10-CM

## 2019-02-26 DIAGNOSIS — H40.1131 PRIMARY OPEN ANGLE GLAUCOMA OF BOTH EYES, MILD STAGE: Primary | ICD-10-CM

## 2019-02-26 DIAGNOSIS — H52.13 MYOPIC ASTIGMATISM OF BOTH EYES: ICD-10-CM

## 2019-02-26 DIAGNOSIS — E11.9 DIABETES MELLITUS TYPE 2 WITHOUT RETINOPATHY (H): ICD-10-CM

## 2019-02-26 DIAGNOSIS — H52.203 MYOPIC ASTIGMATISM OF BOTH EYES: ICD-10-CM

## 2019-02-26 DIAGNOSIS — Z96.1 PSEUDOPHAKIA, BOTH EYES: ICD-10-CM

## 2019-02-26 RX ORDER — KETOCONAZOLE 20 MG/G
CREAM TOPICAL
COMMUNITY
Start: 2019-02-18

## 2019-02-26 RX ORDER — METFORMIN HCL 500 MG
1500 TABLET, EXTENDED RELEASE 24 HR ORAL
COMMUNITY
Start: 2019-02-11

## 2019-02-26 RX ORDER — LATANOPROST 50 UG/ML
1 SOLUTION/ DROPS OPHTHALMIC AT BEDTIME
Qty: 10 ML | Refills: 4 | Status: SHIPPED | OUTPATIENT
Start: 2019-02-26 | End: 2020-01-07

## 2019-02-26 RX ORDER — LISINOPRIL AND HYDROCHLOROTHIAZIDE 12.5; 2 MG/1; MG/1
2 TABLET ORAL
COMMUNITY
Start: 2018-12-12

## 2019-02-26 RX ORDER — GABAPENTIN 100 MG/1
CAPSULE ORAL
COMMUNITY
Start: 2018-05-08

## 2019-02-26 ASSESSMENT — EXTERNAL EXAM - LEFT EYE: OS_EXAM: NORMAL

## 2019-02-26 ASSESSMENT — VISUAL ACUITY
OD_CC: 20/25-3
METHOD: SNELLEN - LINEAR
OS_CC: 20/25
CORRECTION_TYPE: GLASSES

## 2019-02-26 ASSESSMENT — TONOMETRY
OD_IOP_MMHG: 16
OS_IOP_MMHG: 16
IOP_METHOD: APPLANATION

## 2019-02-26 ASSESSMENT — REFRACTION_WEARINGRX
SPECS_TYPE: PAL
OS_ADD: +2.75
OD_CYLINDER: +1.75
OD_AXIS: 180
OS_SPHERE: -0.50
OS_AXIS: 180
OS_CYLINDER: +3.00
OD_ADD: +2.75
OD_SPHERE: -2.75

## 2019-02-26 ASSESSMENT — CONF VISUAL FIELD
OD_NORMAL: 1
METHOD: COUNTING FINGERS
OS_NORMAL: 1

## 2019-02-26 ASSESSMENT — CUP TO DISC RATIO
OS_RATIO: 0.45
OD_RATIO: 0.55

## 2019-02-26 ASSESSMENT — SLIT LAMP EXAM - LIDS
COMMENTS: NORMAL
COMMENTS: NORMAL

## 2019-02-26 ASSESSMENT — EXTERNAL EXAM - RIGHT EYE: OD_EXAM: NORMAL

## 2019-02-26 NOTE — PROGRESS NOTES
HPI  Yordan Nolan is a 74 year old male here for IOP check and glaucoma follow-up. He feels his vision is overall good, but notes that it takes him a while to focus far away after he has been reading. No pain, redness, discharge. No flashes/floaters.    He was recently diagnosed as a diabetic and was started on Metformin.    He is doing well using latanoprost in both eyes at night.     Assessment & Plan      (H40.11X1) Primary open angle glaucoma of both eyes, mild stage  (primary encounter diagnosis)  Comment: Previously followed by Dr. Douglas. S/p SLT OU 11/2015.    FH: Negative  Pachymetry:  Gonioscopy:  Tmax:   Today's IOP: 16/16  Target IOP: 19 or less (per patient)  Current medications: Latanoprost OU QHS  Meds to avoid:  Visual field:OVF 24-2 2/26/19  OD: Generalized depression, MD -5.8, PSD 4.7  OS: Overall full, MD -1.9, PSD 2.2   Nerve OCT: Spectralis optic nerve OCT (2/26/19)  OD: Avg RNFL thickness 78 (last 73), red superior, inferior yellow  OS: Poor quality scan, Avg RNFL thickness 78 (last 77), yellow superior/nasal, red inferior  - Stable    IOP good today with stable ONH appearance - tilted with history of high myopia. Visual field and RNFL also stable.    Plan: Continue current regimen. Return to clinic in 6 months for applanation, no dilation    (E11.9) Diabetes mellitus type 2 without retinopathy (H)  Comment: Diagnosed in 2018. On metformin. Last A1c 8.6 12/2018. No diabetic retinopathy.  Plan: Discussed the importance of tight blood glucose control in the prevention of diabetic retinopathy. Recommend yearly dilated eye exam.    (H02.831,  H02.834) Dermatochalasis of both upper eyelids  Comment: S/p bilateral ptosis repair with Dr. Swann. He is pleased with the result.   Plan: Observe    (I63.9) Cerebrovascular accident (CVA), unspecified (HCC)  Comment: Found to have had a small stroke in 2016 which the neurologist said was in an area that processes vision. Stable VF  testing.  Plan: He is now following with neurology and cardiology.    (Z96.1) Pseudophakia, both eyes  Comment: Good acuity, mild PCO OD  Plan: Observe    (H52.203,  H52.13) Myopic astigmatism of both eyes  (H52.4) Presbyopia  Comment: Good vision today with current glasses  Plan: Observe     -----------------------------------------------------------------------------------    Patient disposition:   Return in about 6 months (around 8/26/2019) for applanation IOP (no dilation). or sooner as needed.    Teaching statement:  Complete documentation of historical and exam elements from today's encounter can be found in the full encounter summary report (not reduplicated in this progress note). I personally obtained the chief complaint(s) and history of present illness.  I confirmed and edited as necessary the review of systems, past medical/surgical history, family history, social history, and examination findings as documented by others; and I examined the patient myself. I personally reviewed the relevant tests, images, and reports as documented above.     I formulated and edited as necessary the assessment and plan and discussed the findings and management plan with the patient and family.      Milady Colon MD  Comprehensive Ophthalmology & Ocular Pathology  Department of Ophthalmology and Visual Neurosciences  [email or web address]  Pager [phone]

## 2019-07-24 ENCOUNTER — OFFICE VISIT (OUTPATIENT)
Dept: OPHTHALMOLOGY | Facility: CLINIC | Age: 74
End: 2019-07-24
Attending: OPHTHALMOLOGY
Payer: MEDICARE

## 2019-07-24 DIAGNOSIS — H52.4 PRESBYOPIA: ICD-10-CM

## 2019-07-24 DIAGNOSIS — H40.1131 PRIMARY OPEN ANGLE GLAUCOMA OF BOTH EYES, MILD STAGE: Primary | ICD-10-CM

## 2019-07-24 DIAGNOSIS — H52.203 MYOPIC ASTIGMATISM OF BOTH EYES: ICD-10-CM

## 2019-07-24 DIAGNOSIS — E11.9 DIABETES MELLITUS TYPE 2 WITHOUT RETINOPATHY (H): ICD-10-CM

## 2019-07-24 DIAGNOSIS — Z96.1 PSEUDOPHAKIA, BOTH EYES: ICD-10-CM

## 2019-07-24 DIAGNOSIS — H52.13 MYOPIC ASTIGMATISM OF BOTH EYES: ICD-10-CM

## 2019-07-24 ASSESSMENT — REFRACTION_WEARINGRX
OS_AXIS: 180
OD_AXIS: 180
OS_ADD: +2.75
OD_SPHERE: -2.75
OD_ADD: +2.75
SPECS_TYPE: PAL
OS_CYLINDER: +3.00
OD_CYLINDER: +1.75
OS_SPHERE: -0.50

## 2019-07-24 ASSESSMENT — REFRACTION_MANIFEST
OD_CYLINDER: +1.75
OS_CYLINDER: +3.00
OS_AXIS: 180
OD_AXIS: 180
OD_SPHERE: -2.75
OS_SPHERE: -0.50
OS_ADD: +2.75
OS_CYLINDER: +3.00
OD_SPHERE: +0.75
OD_AXIS: 180
OS_AXIS: 180
OD_CYLINDER: +1.75
OD_ADD: +2.75
OS_SPHERE: +1.50

## 2019-07-24 ASSESSMENT — VISUAL ACUITY
METHOD: SNELLEN - LINEAR
CORRECTION_TYPE: GLASSES
OS_CC: 20/20
OD_CC+: -3
OS_CC+: -3
OD_CC: 20/20

## 2019-07-24 ASSESSMENT — TONOMETRY
OD_IOP_MMHG: 16
OS_IOP_MMHG: 17
IOP_METHOD: APPLANATION

## 2019-07-24 ASSESSMENT — EXTERNAL EXAM - RIGHT EYE: OD_EXAM: NORMAL

## 2019-07-24 ASSESSMENT — EXTERNAL EXAM - LEFT EYE: OS_EXAM: NORMAL

## 2019-07-24 ASSESSMENT — CONF VISUAL FIELD
OS_NORMAL: 1
OD_NORMAL: 1
METHOD: COUNTING FINGERS

## 2019-07-24 ASSESSMENT — CUP TO DISC RATIO
OS_RATIO: 0.45
OD_RATIO: 0.55

## 2019-07-24 ASSESSMENT — SLIT LAMP EXAM - LIDS
COMMENTS: NORMAL
COMMENTS: NORMAL

## 2019-07-24 NOTE — NURSING NOTE
Chief Complaints and History of Present Illnesses   Patient presents with     Glaucoma Follow-Up     Chief Complaint(s) and History of Present Illness(es)     Glaucoma Follow-Up               Comments     Glaucoma follow up.    The patient states his vision is about the same as last visit.  He does have difficulty reading small print.  Vision is sometimes blurry.  His glasses are about a year old.    The patient denies eye pain.

## 2019-07-24 NOTE — PROGRESS NOTES
HPI  Yordan Nolan is a 74 year old male here for IOP check and glaucoma follow-up. He feels his vision is overall good and stable in both eyes. No pain, redness, discharge. No flashes/floaters. He is doing well using latanoprost in both eyes at night.     He is diabetic on Metformin.    Assessment & Plan      (H40.11X1) Primary open angle glaucoma of both eyes, mild stage  (primary encounter diagnosis)  Comment: Previously followed by Dr. Douglas. S/p SLT OU 11/2015.    FH: Negative  Pachymetry:  Gonioscopy:  Tmax:   Today's IOP: 16/17  Target IOP: 19 or less (per patient)  Current medications: Latanoprost OU QHS  Meds to avoid:  Visual field:OVF 24-2 2/26/19  OD: Generalized depression, MD -5.8, PSD 4.7  OS: Overall full, MD -1.9, PSD 2.2   Nerve OCT: Spectralis optic nerve OCT (2/26/19)  OD: Avg RNFL thickness 78 (last 73), red superior, inferior yellow  OS: Poor quality scan, Avg RNFL thickness 78 (last 77), yellow superior/nasal, red inferior  - Stable    IOP good today with stable ONH appearance - tilted with history of high myopia.    Plan: Continue current regimen. Return to clinic in 6 months for applanation, OVF 24-2, dilation, nerve OCT OU    (E11.9) Diabetes mellitus type 2 without retinopathy (H)  Comment: Diagnosed in 2018. On metformin. Last A1c 8.6 12/2018. No diabetic retinopathy.  Plan: Discussed the importance of tight blood glucose control in the prevention of diabetic retinopathy. Recommend yearly dilated eye exam.    (H02.831,  H02.834) Dermatochalasis of both upper eyelids  Comment: S/p bilateral ptosis repair with Dr. Swann. He is pleased with the result.   Plan: Observe    (I63.9) Cerebrovascular accident (CVA), unspecified (HCC)  Comment: Found to have had a small stroke in 2016 which the neurologist said was in an area that processes vision. Stable VF testing at last visit.  Plan: He is now following with neurology and cardiology.    (Z96.1) Pseudophakia, both eyes  Comment:  Good acuity, some increased PCO OD  Plan: Observe for now as he is asymptomatic. Recheck in 6 months at next DFE.    (H52.203,  H52.13) Myopic astigmatism of both eyes  (H52.4) Presbyopia  Comment: Good vision today with current glasses  Plan: Observe     -----------------------------------------------------------------------------------    Patient disposition:   Return in about 6 months (around 1/24/2020) for applanation IOP, OVF 24-2, dilation, nerve OCT OU. or sooner as needed.    Teaching statement:  Complete documentation of historical and exam elements from today's encounter can be found in the full encounter summary report (not reduplicated in this progress note). I personally obtained the chief complaint(s) and history of present illness.  I confirmed and edited as necessary the review of systems, past medical/surgical history, family history, social history, and examination findings as documented by others; and I examined the patient myself. I personally reviewed the relevant tests, images, and reports as documented above.     I formulated and edited as necessary the assessment and plan and discussed the findings and management plan with the patient and family.      Milady Colon MD  Comprehensive Ophthalmology & Ocular Pathology  Department of Ophthalmology and Visual Neurosciences  [email or web address]  Pager [phone]

## 2019-09-29 ENCOUNTER — HEALTH MAINTENANCE LETTER (OUTPATIENT)
Age: 74
End: 2019-09-29

## 2020-01-06 DIAGNOSIS — H40.1131 PRIMARY OPEN ANGLE GLAUCOMA OF BOTH EYES, MILD STAGE: Primary | ICD-10-CM

## 2020-01-07 ENCOUNTER — OFFICE VISIT (OUTPATIENT)
Dept: OPHTHALMOLOGY | Facility: CLINIC | Age: 75
End: 2020-01-07
Attending: OPHTHALMOLOGY
Payer: MEDICARE

## 2020-01-07 DIAGNOSIS — H40.1131 PRIMARY OPEN ANGLE GLAUCOMA OF BOTH EYES, MILD STAGE: ICD-10-CM

## 2020-01-07 PROBLEM — Z78.9 ASPIRIN INTOLERANCE: Status: ACTIVE | Noted: 2019-07-19

## 2020-01-07 PROBLEM — I48.91 ATRIAL FIBRILLATION (H): Status: ACTIVE | Noted: 2017-02-08

## 2020-01-07 PROBLEM — M48.062 LUMBAR STENOSIS WITH NEUROGENIC CLAUDICATION: Status: ACTIVE | Noted: 2017-12-05

## 2020-01-07 PROCEDURE — 92133 CPTRZD OPH DX IMG PST SGM ON: CPT | Mod: ZF | Performed by: OPHTHALMOLOGY

## 2020-01-07 PROCEDURE — 92083 EXTENDED VISUAL FIELD XM: CPT | Mod: ZF | Performed by: OPHTHALMOLOGY

## 2020-01-07 PROCEDURE — G0463 HOSPITAL OUTPT CLINIC VISIT: HCPCS | Mod: ZF

## 2020-01-07 RX ORDER — LATANOPROST 50 UG/ML
1 SOLUTION/ DROPS OPHTHALMIC AT BEDTIME
Qty: 10 ML | Refills: 4 | Status: SHIPPED | OUTPATIENT
Start: 2020-01-07 | End: 2020-12-14

## 2020-01-07 RX ORDER — TRIAMCINOLONE ACETONIDE 55 UG/1
2 SPRAY, METERED NASAL
COMMUNITY
Start: 2019-12-18

## 2020-01-07 ASSESSMENT — REFRACTION_WEARINGRX
OD_ADD: +2.75
OS_CYLINDER: +3.00
OD_AXIS: 180
OS_SPHERE: -0.50
OS_ADD: +2.75
OD_CYLINDER: +1.75
SPECS_TYPE: PAL
OS_AXIS: 180
OD_SPHERE: -2.75

## 2020-01-07 ASSESSMENT — CONF VISUAL FIELD
METHOD: COUNTING FINGERS
OS_NORMAL: 1
OD_NORMAL: 1

## 2020-01-07 ASSESSMENT — EXTERNAL EXAM - LEFT EYE: OS_EXAM: NORMAL

## 2020-01-07 ASSESSMENT — TONOMETRY
OS_IOP_MMHG: 18
OD_IOP_MMHG: 18
IOP_METHOD: APPLANATION

## 2020-01-07 ASSESSMENT — CUP TO DISC RATIO
OS_RATIO: 0.45
OD_RATIO: 0.55

## 2020-01-07 ASSESSMENT — VISUAL ACUITY
OD_CC: 20/20
CORRECTION_TYPE: GLASSES
OS_CC: 20/20
METHOD: SNELLEN - LINEAR

## 2020-01-07 ASSESSMENT — SLIT LAMP EXAM - LIDS
COMMENTS: NORMAL
COMMENTS: NORMAL

## 2020-01-07 ASSESSMENT — EXTERNAL EXAM - RIGHT EYE: OD_EXAM: NORMAL

## 2020-01-07 NOTE — PROGRESS NOTES
HPI  Yordan Nolan is a 74 year old male here for glaucoma follow-up. He feels his vision is overall good and stable in both eyes, but notes an occasional fogginess that comes and goes with associated eye dryness. He uses ATs as needed which do help. No pain, redness, discharge. No flashes/floaters. He is doing well using latanoprost in both eyes at night.     He is diabetic on Metformin.    Assessment & Plan      (H40.11X1) Primary open angle glaucoma of both eyes, mild stage  (primary encounter diagnosis)  Comment: Previously followed by Dr. Douglas. S/p SLT OU 11/2015.    FH: Negative  Pachymetry:  Gonioscopy:  Tmax:   Today's IOP: 18/18  Target IOP: 19 or less (per patient)  Current medications: Latanoprost OU QHS  Meds to avoid:  Visual field:OVF 24-2 1/7/2020  OD: Generalized depression, MD -4.8, PSD 4.8  OS: Overall full, MD -2.2, PSD 2.9   Nerve OCT: SpectralVAIREX international optic nerve OCT (1/7/2020); New machine (previously done at [facility])  OD: Poor quality scan, Central RNFL thickness 64 (last 72), red superior, inferior yellow  OS: Poor quality scan, Central RNFL thickness 72 (last 78), yellow superior/nasal, red inferior  Appears more thin, but question scan quality and new machine. Will recheck next visit.    IOP good today with stable ONH appearance - tilted with history of high myopia.    Plan: Continue current regimen (refilled). Return to clinic in 6 months for applanation, nerve OCT both eyes (no dilation)    (E11.9) Diabetes mellitus type 2 without retinopathy (H)  Comment: Diagnosed in 2018. On metformin. No recent A1c in [facility], but states his PCP is happy. No diabetic retinopathy.  Plan: Discussed the importance of tight blood glucose control in the prevention of diabetic retinopathy. Recommend yearly dilated eye exam.    (H02.831,  H02.834) Dermatochalasis of both upper eyelids  Comment: S/p bilateral ptosis repair with Dr. Swann. He is pleased with the result.   Plan: Observe    (I63.9)  Cerebrovascular accident (CVA), unspecified (HCC)  Comment: Found to have had a small stroke in 2016 which the neurologist said was in an area that processes vision. Stable VF testing.  Plan: He is now following with neurology and cardiology.    (Z96.1) Pseudophakia, both eyes  Comment: Good acuity, some increased PCO OD  Plan: Observe for now as he is asymptomatic.    (H52.203,  H52.13) Myopic astigmatism of both eyes  (H52.4) Presbyopia  Comment: Good vision today with current glasses  Plan: Observe     -----------------------------------------------------------------------------------    Patient disposition:   Return in about 6 months (around 7/7/2020) for applanation IOP, nerve OCT OU (no dilation). or sooner as needed.    Teaching statement:  Complete documentation of historical and exam elements from today's encounter can be found in the full encounter summary report (not reduplicated in this progress note). I personally obtained the chief complaint(s) and history of present illness.  I confirmed and edited as necessary the review of systems, past medical/surgical history, family history, social history, and examination findings as documented by others; and I examined the patient myself. I personally reviewed the relevant tests, images, and reports as documented above.     I formulated and edited as necessary the assessment and plan and discussed the findings and management plan with the patient and family.      Milady Colon MD  Comprehensive Ophthalmology & Ocular Pathology  Department of Ophthalmology and Visual Neurosciences  [email or web address]  Pager [phone]

## 2020-03-15 ENCOUNTER — HEALTH MAINTENANCE LETTER (OUTPATIENT)
Age: 75
End: 2020-03-15

## 2020-07-08 ENCOUNTER — OFFICE VISIT (OUTPATIENT)
Dept: OPHTHALMOLOGY | Facility: CLINIC | Age: 75
End: 2020-07-08
Payer: MEDICARE

## 2020-07-08 DIAGNOSIS — H52.4 PRESBYOPIA: ICD-10-CM

## 2020-07-08 DIAGNOSIS — H52.203 MYOPIC ASTIGMATISM OF BOTH EYES: ICD-10-CM

## 2020-07-08 DIAGNOSIS — H40.1131 PRIMARY OPEN ANGLE GLAUCOMA OF BOTH EYES, MILD STAGE: Primary | ICD-10-CM

## 2020-07-08 DIAGNOSIS — H52.13 MYOPIC ASTIGMATISM OF BOTH EYES: ICD-10-CM

## 2020-07-08 DIAGNOSIS — Z96.1 PSEUDOPHAKIA, BOTH EYES: ICD-10-CM

## 2020-07-08 DIAGNOSIS — E11.9 DIABETES MELLITUS TYPE 2 WITHOUT RETINOPATHY (H): ICD-10-CM

## 2020-07-08 PROCEDURE — 92133 CPTRZD OPH DX IMG PST SGM ON: CPT | Mod: ZF | Performed by: OPHTHALMOLOGY

## 2020-07-08 PROCEDURE — G0463 HOSPITAL OUTPT CLINIC VISIT: HCPCS | Mod: ZF

## 2020-07-08 ASSESSMENT — SLIT LAMP EXAM - LIDS
COMMENTS: NORMAL
COMMENTS: NORMAL

## 2020-07-08 ASSESSMENT — CONF VISUAL FIELD
OS_NORMAL: 1
METHOD: COUNTING FINGERS
OD_NORMAL: 1

## 2020-07-08 ASSESSMENT — REFRACTION_WEARINGRX
OD_CYLINDER: +1.75
OS_CYLINDER: +3.00
OD_AXIS: 180
OD_ADD: +2.75
OS_SPHERE: -0.50
SPECS_TYPE: PAL
OS_AXIS: 180
OS_ADD: +2.75
OD_SPHERE: -2.75

## 2020-07-08 ASSESSMENT — TONOMETRY
OS_IOP_MMHG: 17
IOP_METHOD: APPLANATION
OD_IOP_MMHG: 19

## 2020-07-08 ASSESSMENT — VISUAL ACUITY
OS_CC: 20/20
METHOD: SNELLEN - LINEAR
OD_CC+: -1
CORRECTION_TYPE: GLASSES
OD_CC: 20/25

## 2020-07-08 ASSESSMENT — EXTERNAL EXAM - RIGHT EYE: OD_EXAM: NORMAL

## 2020-07-08 ASSESSMENT — EXTERNAL EXAM - LEFT EYE: OS_EXAM: NORMAL

## 2020-07-08 ASSESSMENT — CUP TO DISC RATIO
OS_RATIO: 0.45
OD_RATIO: 0.55

## 2020-07-08 NOTE — PROGRESS NOTES
HPI  Yordan Nolan is a 75 year old male here for glaucoma follow-up. He feels his vision is overall good and stable in both eyes, but notes a continued fogginess of the right eye that may be slowly progressing and getting cloudier. He uses ATs as needed which do help some. No pain, redness, discharge. No flashes/floaters. He is doing well using latanoprost in both eyes at night.     He is diabetic on Metformin.    Assessment & Plan      (H40.11X1) Primary open angle glaucoma of both eyes, mild stage  (primary encounter diagnosis)  Comment: Previously followed by Dr. Douglas. S/p SLT OU 11/2015.    FH: Negative  Pachymetry:  Gonioscopy:  Tmax:   Today's IOP: 19/17  Target IOP: 19 or less (per patient)  Current medications: Latanoprost OU QHS  Meds to avoid:  Visual field:OVF 24-2 1/7/2020  OD: Generalized depression, MD -4.8, PSD 4.8  OS: Overall full, MD -2.2, PSD 2.9   Nerve OCT: Spectralis optic nerve OCT (7/8/2020); New machine (previously done at [facility])   OD: Poor quality scan, Central RNFL thickness 70 (last 64), red superior, inferior yellow  OS: Poor quality scan, Central RNFL thickness 73 (last 72), yellow superior/nasal, red inferior  Today's scan more consistent with previous [facility] scans    IOP good today with stable ONH appearance - tilted with history of high myopia.    Plan: Continue current regimen. Return to clinic in 6 months for applanation, OVF 24-2, dilation, nerve OCT both eyes.    (E11.9) Diabetes mellitus type 2 without retinopathy (H)  Comment: Diagnosed in 2018. On metformin. No recent A1c in [facility], but states his PCP is happy. No diabetic retinopathy at last DFE 1/2020.  Plan: Discussed the importance of tight blood glucose control in the prevention of diabetic retinopathy. Recommend yearly dilated eye exam.    (H02.831,  H02.834) Dermatochalasis of both upper eyelids  Comment: S/p bilateral ptosis repair with Dr. Swann. He is pleased with the result.   Plan: Observe    (I63.9)  Cerebrovascular accident (CVA), unspecified (HCC)  Comment: Found to have had a small stroke in 2016 which the neurologist said was in an area that processes vision. Stable VF testing.  Plan: He is now following with neurology and cardiology.    (Z96.1) Pseudophakia, both eyes  Comment: Good acuity, some increased PCO OD   Plan: May be some increased symptoms - dilate next visit and consider YAG cap    (H52.203,  H52.13) Myopic astigmatism of both eyes  (H52.4) Presbyopia  Comment: Good vision today with current glasses  Plan: Observe     -----------------------------------------------------------------------------------    Patient disposition:   Return in about 6 months (around 1/8/2021) for applanation IOP, OVF 24-2, dilation, nerve OCT OU, possible YAG cap right eye. or sooner as needed.    Teaching statement:  Complete documentation of historical and exam elements from today's encounter can be found in the full encounter summary report (not reduplicated in this progress note). I personally obtained the chief complaint(s) and history of present illness.  I confirmed and edited as necessary the review of systems, past medical/surgical history, family history, social history, and examination findings as documented by others; and I examined the patient myself. I personally reviewed the relevant tests, images, and reports as documented above.     I formulated and edited as necessary the assessment and plan and discussed the findings and management plan with the patient and family.      Milady Colon MD  Comprehensive Ophthalmology & Ocular Pathology  Department of Ophthalmology and Visual Neurosciences  [email or web address]  Pager [phone]

## 2020-07-08 NOTE — NURSING NOTE
Chief Complaints and History of Present Illnesses   Patient presents with     Glaucoma Follow-Up     6 month follow up POAG, both eyes     Chief Complaint(s) and History of Present Illness(es)     Glaucoma Follow-Up     Laterality: both eyes    Associated symptoms: Negative for eye pain, dryness, tearing and discharge    Treatment side effects: none    Compliance with Treatment: always    Pain scale: 0/10    Comments: 6 month follow up POAG, both eyes              Comments     Pt denies any significant vision changes in either eye since last visit.  Denies any pain, pressure, irritation, discharge, and tearing.  Ocular Meds: Latanoprost at bedtime LUL Whalen OT 8:48 AM July 8, 2020

## 2020-10-05 PROBLEM — E11.65 TYPE 2 DIABETES MELLITUS WITH HYPERGLYCEMIA (H): Status: ACTIVE | Noted: 2018-12-16

## 2020-12-14 ENCOUNTER — OFFICE VISIT (OUTPATIENT)
Dept: OPHTHALMOLOGY | Facility: CLINIC | Age: 75
End: 2020-12-14
Attending: OPHTHALMOLOGY
Payer: MEDICARE

## 2020-12-14 DIAGNOSIS — H52.203 MYOPIC ASTIGMATISM OF BOTH EYES: ICD-10-CM

## 2020-12-14 DIAGNOSIS — H02.834 DERMATOCHALASIS OF BOTH UPPER EYELIDS: ICD-10-CM

## 2020-12-14 DIAGNOSIS — H02.831 DERMATOCHALASIS OF BOTH UPPER EYELIDS: ICD-10-CM

## 2020-12-14 DIAGNOSIS — H40.1131 PRIMARY OPEN ANGLE GLAUCOMA OF BOTH EYES, MILD STAGE: Primary | ICD-10-CM

## 2020-12-14 DIAGNOSIS — E11.9 DIABETES MELLITUS TYPE 2 WITHOUT RETINOPATHY (H): ICD-10-CM

## 2020-12-14 DIAGNOSIS — H52.4 PRESBYOPIA: ICD-10-CM

## 2020-12-14 DIAGNOSIS — Z96.1 PSEUDOPHAKIA, BOTH EYES: ICD-10-CM

## 2020-12-14 DIAGNOSIS — H52.13 MYOPIC ASTIGMATISM OF BOTH EYES: ICD-10-CM

## 2020-12-14 PROCEDURE — 92083 EXTENDED VISUAL FIELD XM: CPT | Performed by: OPHTHALMOLOGY

## 2020-12-14 PROCEDURE — G0463 HOSPITAL OUTPT CLINIC VISIT: HCPCS

## 2020-12-14 PROCEDURE — 99214 OFFICE O/P EST MOD 30 MIN: CPT | Performed by: OPHTHALMOLOGY

## 2020-12-14 PROCEDURE — 92133 CPTRZD OPH DX IMG PST SGM ON: CPT | Performed by: OPHTHALMOLOGY

## 2020-12-14 RX ORDER — LATANOPROST 50 UG/ML
1 SOLUTION/ DROPS OPHTHALMIC AT BEDTIME
Qty: 10 ML | Refills: 4 | Status: SHIPPED | OUTPATIENT
Start: 2020-12-14 | End: 2022-01-04

## 2020-12-14 ASSESSMENT — REFRACTION_WEARINGRX
OD_CYLINDER: +1.75
OS_SPHERE: -0.50
OS_ADD: +2.75
OS_CYLINDER: +3.00
OD_SPHERE: -2.75
OS_AXIS: 180
SPECS_TYPE: PAL
OD_AXIS: 180
OD_ADD: +2.75

## 2020-12-14 ASSESSMENT — EXTERNAL EXAM - LEFT EYE: OS_EXAM: NORMAL

## 2020-12-14 ASSESSMENT — TONOMETRY
OD_IOP_MMHG: 18
IOP_METHOD: APPLANATION
OS_IOP_MMHG: 16

## 2020-12-14 ASSESSMENT — VISUAL ACUITY
OS_CC: 20/20
METHOD: SNELLEN - LINEAR
CORRECTION_TYPE: GLASSES
OD_CC: 20/20
OS_CC+: -1
OD_CC+: -1

## 2020-12-14 ASSESSMENT — CUP TO DISC RATIO
OS_RATIO: 0.45
OD_RATIO: 0.55

## 2020-12-14 ASSESSMENT — SLIT LAMP EXAM - LIDS
COMMENTS: NORMAL
COMMENTS: NORMAL

## 2020-12-14 ASSESSMENT — CONF VISUAL FIELD
OD_NORMAL: 1
METHOD: COUNTING FINGERS
OS_NORMAL: 1

## 2020-12-14 ASSESSMENT — EXTERNAL EXAM - RIGHT EYE: OD_EXAM: NORMAL

## 2020-12-14 NOTE — NURSING NOTE
Chief Complaints and History of Present Illnesses   Patient presents with     Follow Up     Primary open angle glaucoma of both eyes, mild stage      Chief Complaint(s) and History of Present Illness(es)     Follow Up     Laterality: both eyes    Onset: gradual    Onset: years ago    Associated symptoms: dryness, floaters (No change.), glare and haloes.  Negative for eye pain, tearing, flashes and photophobia    Treatments tried: artificial tears and eye drops    Comments: Primary open angle glaucoma of both eyes, mild stage               Comments     Pt states vision is stable since last visit.  Some glare/halo problems.      Latanoprost BE 1x at night.  AT's PRN    DM 2  No results found for: A1C  Pt reports last A1C was 6.1 about 3 weeks ago.  BS were 165 last night.    CYNTHIA Baron December 14, 2020 2:00 PM

## 2020-12-14 NOTE — PROGRESS NOTES
HPI  Yordan Nolan is a 75 year old male here for glaucoma follow-up. He feels his vision is overall good and stable in both eyes. He notes some difficulty transitioning from reading to distance vision. He hasn't noted much change in the right eye, no progressive cloudiness. He uses ATs as needed. No pain, redness, discharge. No flashes/floaters. He is doing well using latanoprost in both eyes at night.     He is diabetic on Metformin.    Assessment & Plan      (H40.11X1) Primary open angle glaucoma of both eyes, mild stage  (primary encounter diagnosis)  Comment: Previously followed by Dr. Douglas. S/p SLT OU 11/2015.    FH: Negative  Pachymetry:  Gonioscopy:  Tmax:   Today's IOP: 18/16  Target IOP: 19 or less (per patient)  Current medications: Latanoprost OU QHS  Meds to avoid:  Visual field:OVF 24-2 12/14/2020  OD: Generalized depression, MD -4.1, PSD 4.9  OS: Overall full, MD -1.9, PSD 2.9   Nerve OCT: Spectralis optic nerve OCT (12/14/2020)  OD: Poor quality scan, Central RNFL thickness 68 (last 70), red superior, inferior yellow  OS: Poor quality scan, Central RNFL thickness 70 (last 73), yellow superior/nasal, red inferior  Some fluctuation, likely due to tilted nerves, but overall stable.    IOP good today with stable ONH appearance - tilted with history of high myopia.    Plan: Continue current regimen. Return to clinic in 6 months for applanation IOP (no dilation).    (E11.9) Diabetes mellitus type 2 without retinopathy (H)  Comment: Diagnosed in 2018. On metformin. No recent A1c in [facility], but states his PCP is happy. No diabetic retinopathy.  Plan: Discussed the importance of tight blood glucose control in the prevention of diabetic retinopathy. Recommend yearly dilated eye exam.    (H02.831,  H02.834) Dermatochalasis of both upper eyelids  Comment: S/p bilateral ptosis repair with Dr. Swann. He is pleased with the result.   Plan: Observe    (I63.9) Cerebrovascular accident (CVA), unspecified  ([facility])  Comment: Found to have had a small stroke in 2016 which the neurologist said was in an area that processes vision. Stable VF testing.  Plan: He is now following with neurology and cardiology.    (Z96.1) Pseudophakia, both eyes  Comment: Good acuity, some increased PCO OD   Plan: Currently not symptomatic. Monitor for now. Consider YAG if acuity drops or if he becomes symptomatically bothered.    (H52.203,  H52.13) Myopic astigmatism of both eyes  (H52.4) Presbyopia  Comment: Good vision today with current glasses  Plan: Observe     -----------------------------------------------------------------------------------    Patient disposition:   Return in about 6 months (around 6/14/2021) for applanation IOP (no dilation). or sooner as needed.    Teaching statement:  Complete documentation of historical and exam elements from today's encounter can be found in the full encounter summary report (not reduplicated in this progress note). I personally obtained the chief complaint(s) and history of present illness.  I confirmed and edited as necessary the review of systems, past medical/surgical history, family history, social history, and examination findings as documented by others; and I examined the patient myself. I personally reviewed the relevant tests, images, and reports as documented above.     I formulated and edited as necessary the assessment and plan and discussed the findings and management plan with the patient and family.      Milady Colon MD  Comprehensive Ophthalmology & Ocular Pathology  Department of Ophthalmology and Visual Neurosciences  [email or web address]  Pager [phone]

## 2021-01-14 ENCOUNTER — HEALTH MAINTENANCE LETTER (OUTPATIENT)
Age: 76
End: 2021-01-14

## 2021-05-09 ENCOUNTER — HEALTH MAINTENANCE LETTER (OUTPATIENT)
Age: 76
End: 2021-05-09

## 2021-06-14 ENCOUNTER — OFFICE VISIT (OUTPATIENT)
Dept: OPHTHALMOLOGY | Facility: CLINIC | Age: 76
End: 2021-06-14
Attending: OPHTHALMOLOGY
Payer: MEDICARE

## 2021-06-14 DIAGNOSIS — H02.831 DERMATOCHALASIS OF BOTH UPPER EYELIDS: ICD-10-CM

## 2021-06-14 DIAGNOSIS — Z96.1 PSEUDOPHAKIA, BOTH EYES: ICD-10-CM

## 2021-06-14 DIAGNOSIS — H02.834 DERMATOCHALASIS OF BOTH UPPER EYELIDS: ICD-10-CM

## 2021-06-14 DIAGNOSIS — H52.203 MYOPIC ASTIGMATISM OF BOTH EYES: ICD-10-CM

## 2021-06-14 DIAGNOSIS — H52.13 MYOPIC ASTIGMATISM OF BOTH EYES: ICD-10-CM

## 2021-06-14 DIAGNOSIS — H52.13 MYOPIA OF BOTH EYES: ICD-10-CM

## 2021-06-14 DIAGNOSIS — E11.9 DIABETES MELLITUS TYPE 2 WITHOUT RETINOPATHY (H): ICD-10-CM

## 2021-06-14 DIAGNOSIS — H52.4 PRESBYOPIA: ICD-10-CM

## 2021-06-14 DIAGNOSIS — H40.1131 PRIMARY OPEN ANGLE GLAUCOMA OF BOTH EYES, MILD STAGE: Primary | ICD-10-CM

## 2021-06-14 PROCEDURE — 99214 OFFICE O/P EST MOD 30 MIN: CPT | Mod: GC | Performed by: OPHTHALMOLOGY

## 2021-06-14 PROCEDURE — 92015 DETERMINE REFRACTIVE STATE: CPT | Mod: GY | Performed by: OPHTHALMOLOGY

## 2021-06-14 PROCEDURE — G0463 HOSPITAL OUTPT CLINIC VISIT: HCPCS | Mod: 25

## 2021-06-14 RX ORDER — AMLODIPINE BESYLATE 10 MG/1
10 TABLET ORAL DAILY
COMMUNITY
Start: 2021-05-14

## 2021-06-14 ASSESSMENT — REFRACTION_WEARINGRX
OD_ADD: +2.75
OS_SPHERE: -0.50
OS_AXIS: 180
OD_CYLINDER: +1.75
OS_ADD: +2.75
OS_CYLINDER: +3.00
OD_AXIS: 180
OD_SPHERE: -2.75
SPECS_TYPE: PAL

## 2021-06-14 ASSESSMENT — CONF VISUAL FIELD
METHOD: COUNTING FINGERS
OD_NORMAL: 1
OS_NORMAL: 1

## 2021-06-14 ASSESSMENT — SLIT LAMP EXAM - LIDS
COMMENTS: NORMAL
COMMENTS: NORMAL

## 2021-06-14 ASSESSMENT — VISUAL ACUITY
OD_CC: 20/25
OD_CC+: -2
METHOD: SNELLEN - LINEAR
CORRECTION_TYPE: GLASSES
OS_CC+: -3
OS_CC: 20/20

## 2021-06-14 ASSESSMENT — CUP TO DISC RATIO
OS_RATIO: 0.45
OD_RATIO: 0.55

## 2021-06-14 ASSESSMENT — EXTERNAL EXAM - RIGHT EYE: OD_EXAM: NORMAL

## 2021-06-14 ASSESSMENT — TONOMETRY
OD_IOP_MMHG: 15
OS_IOP_MMHG: 14
IOP_METHOD: APPLANATION

## 2021-06-14 ASSESSMENT — REFRACTION_MANIFEST
OD_CYLINDER: +2.00
OS_SPHERE: -0.75
OS_CYLINDER: +3.25
OS_ADD: +2.75
OD_SPHERE: -2.75
OD_ADD: +2.75
OS_AXIS: 007
OD_AXIS: 003

## 2021-06-14 ASSESSMENT — EXTERNAL EXAM - LEFT EYE: OS_EXAM: NORMAL

## 2021-06-14 NOTE — NURSING NOTE
Chief Complaints and History of Present Illnesses   Patient presents with     Glaucoma Follow Up     6 month follow up POAG, both eyes     Chief Complaint(s) and History of Present Illness(es)     Glaucoma Follow Up     Laterality: both eyes    Associated symptoms: burning.  Negative for eye pain, flashes and floaters    Treatment side effects: none    Compliance with Treatment: always    Pain scale: 0/10    Comments: 6 month follow up POAG, both eyes              Comments     Pt denies any vision changes BE since last visit.  Complains of occasional burning sensation BE  Denies any flashes, floaters, or pain.  Ocular meds: Latanoprost at bedtime BE    Romi Whalen OT 9:33 AM June 14, 2021

## 2021-06-14 NOTE — PROGRESS NOTES
HPI  Yordan Nolan is a 76 year old male here for 6 month glaucoma follow-up. He feels his vision is overall good and stable in both eyes. Feels burning and watery eyes in both eyes at times. He uses ATs occasionally. No pain, redness, discharge. No flashes/floaters. He is doing well using latanoprost in both eyes at night.      He is diabetic on Metformin.     Assessment & Plan       (H40.11X1) Primary open angle glaucoma of both eyes, mild stage  (primary encounter diagnosis)  Comment: Previously followed by Dr. Douglas. S/p SLT OU 11/2015.  FH: Negative  Pachymetry:  Gonioscopy:  Tmax: 23/22 ([facility])  Today's IOP: 15/14  Target IOP: 19 or less (per patient)  Current medications: Latanoprost OU QHS  Meds to avoid:  Visual field:OVF 24-2 12/14/2020  OD: Generalized depression, MD -4.1, PSD 4.9  OS: Overall full, MD -1.9, PSD 2.9   Nerve OCT: Spectralis optic nerve OCT (12/14/2020)  OD: Poor quality scan, Central RNFL thickness 68 (last 70), red superior, inferior yellow  OS: Poor quality scan, Central RNFL thickness 70 (last 73), yellow superior/nasal, red inferior  Some fluctuation, likely due to tilted nerves, but overall stable.     IOP good today with stable ONH appearance - tilted with history of high myopia.     Plan: Continue current regimen (latanoprost). Return to clinic in 6 months for applanation, OVF 24-2, dilation, nerve OCT OU.     (E11.9) Diabetes mellitus type 2 without retinopathy (H)  Comment: Diagnosed in 2018. On metformin. No recent A1c in [facility], but states his PCP is happy. No diabetic retinopathy.  Plan: Discussed the importance of tight blood glucose control in the prevention of diabetic retinopathy. Recommend yearly dilated eye exam.     (H02.831,  H02.834) Dermatochalasis of both upper eyelids  Comment: S/p bilateral ptosis repair with Dr. Swann. He is pleased with the result.   Plan: Observe     (I63.9) Cerebrovascular accident (CVA), unspecified (HCC)  Comment: Found to have  had a small stroke in 2016 which the neurologist said was in an area that processes vision. Stable VF testing.  Plan: He is now following with neurology and cardiology.     (Z96.1) Pseudophakia, both eyes  Comment: Good acuity, some increased PCO OD   Plan: Currently not symptomatic. Monitor for now. Consider YAG if acuity drops or if he becomes symptomatically bothered.     (H52.203,  H52.13) Myopic astigmatism of both eyes  (H52.4) Presbyopia  Comment: Good vision today with refraction  Plan: Given updated glasses Rx.     Dispo:  Return in about 6 months (around 12/14/2021) for applanation IOP, OVF 24-2, dilation, nerve OCT OU, or sooner as needed.    Olena Valenzuela MD  PGY-2 Resident Physician  Department of Ophthalmology    Teaching statement:  Complete documentation of historical and exam elements from today's encounter can be found in the full encounter summary report (not reduplicated in this progress note). I personally obtained the chief complaint(s) and history of present illness.  I confirmed and edited as necessary the review of systems, past medical/surgical history, family history, social history, and examination findings as documented by others; and I examined the patient myself. I personally reviewed the relevant tests, images, and reports as documented above.     I formulated and edited as necessary the assessment and plan and discussed the findings and management plan with the patient and family.    Milady Colon MD  Comprehensive Ophthalmology & Ocular Pathology  Department of Ophthalmology and Visual Neurosciences  [email or web address]  Pager [phone]

## 2021-08-29 ENCOUNTER — HEALTH MAINTENANCE LETTER (OUTPATIENT)
Age: 76
End: 2021-08-29

## 2021-10-24 ENCOUNTER — HEALTH MAINTENANCE LETTER (OUTPATIENT)
Age: 76
End: 2021-10-24

## 2021-12-23 DIAGNOSIS — H40.1131 PRIMARY OPEN ANGLE GLAUCOMA OF BOTH EYES, MILD STAGE: Primary | ICD-10-CM

## 2022-01-04 ENCOUNTER — OFFICE VISIT (OUTPATIENT)
Dept: OPHTHALMOLOGY | Facility: CLINIC | Age: 77
End: 2022-01-04
Attending: OPHTHALMOLOGY
Payer: MEDICARE

## 2022-01-04 DIAGNOSIS — H52.13 MYOPIC ASTIGMATISM OF BOTH EYES: ICD-10-CM

## 2022-01-04 DIAGNOSIS — H52.4 PRESBYOPIA: ICD-10-CM

## 2022-01-04 DIAGNOSIS — E11.9 DIABETES MELLITUS TYPE 2 WITHOUT RETINOPATHY (H): ICD-10-CM

## 2022-01-04 DIAGNOSIS — H40.1131 PRIMARY OPEN ANGLE GLAUCOMA OF BOTH EYES, MILD STAGE: ICD-10-CM

## 2022-01-04 DIAGNOSIS — H02.834 DERMATOCHALASIS OF BOTH UPPER EYELIDS: ICD-10-CM

## 2022-01-04 DIAGNOSIS — Z96.1 PSEUDOPHAKIA, BOTH EYES: Primary | ICD-10-CM

## 2022-01-04 DIAGNOSIS — H52.203 MYOPIC ASTIGMATISM OF BOTH EYES: ICD-10-CM

## 2022-01-04 DIAGNOSIS — H02.831 DERMATOCHALASIS OF BOTH UPPER EYELIDS: ICD-10-CM

## 2022-01-04 PROCEDURE — 92133 CPTRZD OPH DX IMG PST SGM ON: CPT | Performed by: OPHTHALMOLOGY

## 2022-01-04 PROCEDURE — 92083 EXTENDED VISUAL FIELD XM: CPT | Performed by: OPHTHALMOLOGY

## 2022-01-04 PROCEDURE — G0463 HOSPITAL OUTPT CLINIC VISIT: HCPCS

## 2022-01-04 PROCEDURE — 99214 OFFICE O/P EST MOD 30 MIN: CPT | Performed by: OPHTHALMOLOGY

## 2022-01-04 RX ORDER — LATANOPROST 50 UG/ML
1 SOLUTION/ DROPS OPHTHALMIC AT BEDTIME
Qty: 10 ML | Refills: 4 | Status: SHIPPED | OUTPATIENT
Start: 2022-01-04

## 2022-01-04 ASSESSMENT — REFRACTION_WEARINGRX
OD_SPHERE: -2.75
SPECS_TYPE: PAL
OS_CYLINDER: +3.00
OD_ADD: +2.75
OS_AXIS: 180
OD_CYLINDER: +1.75
OD_AXIS: 180
OS_SPHERE: -0.50
OS_ADD: +2.75

## 2022-01-04 ASSESSMENT — CUP TO DISC RATIO
OS_RATIO: 0.45
OD_RATIO: 0.55

## 2022-01-04 ASSESSMENT — CONF VISUAL FIELD
OS_NORMAL: 1
OD_NORMAL: 1
METHOD: COUNTING FINGERS

## 2022-01-04 ASSESSMENT — SLIT LAMP EXAM - LIDS
COMMENTS: NORMAL
COMMENTS: NORMAL

## 2022-01-04 ASSESSMENT — VISUAL ACUITY
OD_CC+: -2
OS_CC+: -1
OD_CC: 20/25
METHOD: SNELLEN - LINEAR
CORRECTION_TYPE: GLASSES
OS_CC: 20/20

## 2022-01-04 ASSESSMENT — TONOMETRY
OD_IOP_MMHG: 15
OS_IOP_MMHG: 15
IOP_METHOD: APPLANATION

## 2022-01-04 ASSESSMENT — EXTERNAL EXAM - LEFT EYE: OS_EXAM: NORMAL

## 2022-01-04 ASSESSMENT — EXTERNAL EXAM - RIGHT EYE: OD_EXAM: NORMAL

## 2022-01-04 NOTE — PROGRESS NOTES
HPI     Glaucoma Follow Up     In both eyes.  Associated symptoms include dryness and burning.  Negative for floaters, flashes and eye pain.  Side effects of treatment include none.  Treatment compliance is always.  Pain was noted as 0/10.              Comments     6 month follow up POAG, both eyes  Complains of vision BE becoming cloudy occasionally.  Complains of occasional dryness and burning sensation.  Ocular meds: Latanoprost at bedtime BE & AT's PRN BE    Romi Whalen OT 10:23 AM January 4, 2022             Last edited by Romi Whalen on 1/4/2022 10:33 AM. (History)        HPI  Yordan Nolan is a 76 year old male here for 6 month glaucoma follow-up. He feels his vision is overall good and stable in both eyes. He is using his latanoprost in both eyes nightly and ATs PRN. He has occasional intermittent eye burning. No flashes/floaters. He is doing well using latanoprost in both eyes at night.      He is diabetic on Metformin.     Assessment & Plan       (H40.11X1) Primary open angle glaucoma of both eyes, mild stage  (primary encounter diagnosis)  Comment: Previously followed by Dr. Douglas. S/p SLT OU 11/2015.  FH: Negative  Pachymetry:  Gonioscopy:  Tmax: 23/22 ([facility])  Today's IOP: 15/15  Target IOP: 19 or less (per patient)  Current medications: Latanoprost OU QHS  Meds to avoid:  Visual field:OVF 24-2 1/4/22  OD: Generalized depression and paracentral depression, MD -5.5, PSD 4.6  OS: Few superior depressed points, MD -1.7, PSD 2.8  Nerve OCT: Spectralis optic nerve OCT (1/4/22)  OD: Central RNFL thickness 65, red superior/nasal, yellow inf  OS: Central RNFL thickness 67, yellow superior, red nasal, red inferotemporal  Some fluctuation, likely due to tilted nerves, but overall stable.     IOP good today with stable ONH appearance - tilted with history of high myopia.     Plan: Continue current regimen (latanoprost). Return to clinic in 6 months for applanation IOP (no dilation)     (E11.9)  Diabetes mellitus type 2 without retinopathy (H)  Comment: Diagnosed in 2018. On metformin. No recent A1c in [facility]. No diabetic retinopathy.  Plan: Discussed the importance of tight blood glucose control in the prevention of diabetic retinopathy. Recommend yearly dilated eye exam.     (H02.831,  H02.834) Dermatochalasis of both upper eyelids  Comment: S/p bilateral ptosis repair with Dr. Swann. He is pleased with the result.   Plan: Observe     (I63.9) Cerebrovascular accident (CVA), unspecified (HCC)  Comment: Found to have had a small stroke in 2016 which the neurologist said was in an area that processes vision. Stable VF testing.  Plan: He is now following with neurology and cardiology.     (Z96.1) Pseudophakia, both eyes  Comment: Good acuity, some increased PCO OD with [facility] of 20/25-  Plan: Currently not symptomatic. Monitor for now. Consider YAG if acuity drops or if he becomes symptomatically bothered.     (H52.203,  H52.13) Myopic astigmatism of both eyes  (H52.4) Presbyopia  Comment: Good vision today with current glasses  Plan: Monitor    Dispo:  Return in about 6 months (around 7/4/2022) for applanation IOP (no dilation), or sooner as needed.      Teaching statement:  Complete documentation of historical and exam elements from today's encounter can be found in the full encounter summary report (not reduplicated in this progress note). I personally obtained the chief complaint(s) and history of present illness.  I confirmed and edited as necessary the review of systems, past medical/surgical history, family history, social history, and examination findings as documented by others; and I examined the patient myself. I personally reviewed the relevant tests, images, and reports as documented above.     I formulated and edited as necessary the assessment and plan and discussed the findings and management plan with the patient and family.    Milady Colon MD  Comprehensive Ophthalmology & Ocular  Pathology  Department of Ophthalmology and Visual Neurosciences  [email or web address]  Pager [phone]

## 2022-01-04 NOTE — NURSING NOTE
Chief Complaints and History of Present Illnesses   Patient presents with     Glaucoma Follow Up     Chief Complaint(s) and History of Present Illness(es)     Glaucoma Follow Up     Laterality: both eyes    Associated symptoms: dryness and burning.  Negative for floaters, flashes and eye pain    Treatment side effects: none    Compliance with Treatment: always    Pain scale: 0/10              Comments     6 month follow up POAG, both eyes  Complains of vision BE becoming cloudy occasionally.  Complains of occasional dryness and burning sensation.  Ocular meds: Latanoprost at bedtime BE & AT's PRN BE    Romi Whalen OT 10:23 AM January 4, 2022

## 2022-06-05 ENCOUNTER — HEALTH MAINTENANCE LETTER (OUTPATIENT)
Age: 77
End: 2022-06-05

## 2022-10-15 ENCOUNTER — HEALTH MAINTENANCE LETTER (OUTPATIENT)
Age: 77
End: 2022-10-15

## 2023-03-26 ENCOUNTER — HEALTH MAINTENANCE LETTER (OUTPATIENT)
Age: 78
End: 2023-03-26

## 2023-06-11 ENCOUNTER — HEALTH MAINTENANCE LETTER (OUTPATIENT)
Age: 78
End: 2023-06-11

## 2023-10-29 ENCOUNTER — HEALTH MAINTENANCE LETTER (OUTPATIENT)
Age: 78
End: 2023-10-29

## 2024-05-26 ENCOUNTER — HEALTH MAINTENANCE LETTER (OUTPATIENT)
Age: 79
End: 2024-05-26

## 2024-08-04 ENCOUNTER — HEALTH MAINTENANCE LETTER (OUTPATIENT)
Age: 79
End: 2024-08-04

## 2024-12-21 ENCOUNTER — HEALTH MAINTENANCE LETTER (OUTPATIENT)
Age: 79
End: 2024-12-21

## (undated) DEVICE — GLOVE PROTEXIS W/NEU-THERA 7.5  2D73TE75

## (undated) DEVICE — SOL NACL 0.9% IRRIG 1000ML BOTTLE 07138-09

## (undated) DEVICE — SU PLAIN FAST ABSORB 6-0 PC-1 18" 1916G

## (undated) DEVICE — PACK OCULOPLATIC SEN15OCFSD

## (undated) DEVICE — EYE PREP BETADINE 5% SOLUTION 30ML 0065-0411-30

## (undated) DEVICE — LINEN TOWEL PACK X5 5464

## (undated) DEVICE — ESU EYE HIGH TEMP 65410-183

## (undated) DEVICE — SOL WATER IRRIG 1000ML BOTTLE 2F7114

## (undated) RX ORDER — FENTANYL CITRATE 50 UG/ML
INJECTION, SOLUTION INTRAMUSCULAR; INTRAVENOUS
Status: DISPENSED
Start: 2018-05-04

## (undated) RX ORDER — HYDROCODONE BITARTRATE AND ACETAMINOPHEN 5; 325 MG/1; MG/1
TABLET ORAL
Status: DISPENSED
Start: 2018-05-04